# Patient Record
Sex: FEMALE | Race: WHITE | NOT HISPANIC OR LATINO | Employment: OTHER | URBAN - NONMETROPOLITAN AREA
[De-identification: names, ages, dates, MRNs, and addresses within clinical notes are randomized per-mention and may not be internally consistent; named-entity substitution may affect disease eponyms.]

---

## 2023-05-12 ENCOUNTER — TELEPHONE (OUTPATIENT)
Dept: OBSTETRICS AND GYNECOLOGY | Facility: CLINIC | Age: 73
End: 2023-05-12

## 2023-09-06 ENCOUNTER — OFFICE VISIT (OUTPATIENT)
Dept: OBSTETRICS AND GYNECOLOGY | Facility: CLINIC | Age: 73
End: 2023-09-06
Payer: MEDICARE

## 2023-09-06 VITALS
WEIGHT: 147 LBS | HEIGHT: 66 IN | DIASTOLIC BLOOD PRESSURE: 70 MMHG | BODY MASS INDEX: 23.63 KG/M2 | SYSTOLIC BLOOD PRESSURE: 124 MMHG

## 2023-09-06 DIAGNOSIS — B37.2 YEAST INFECTION OF THE SKIN: Primary | ICD-10-CM

## 2023-09-06 PROCEDURE — 1160F RVW MEDS BY RX/DR IN RCRD: CPT | Performed by: PHYSICIAN ASSISTANT

## 2023-09-06 PROCEDURE — 99203 OFFICE O/P NEW LOW 30 MIN: CPT | Performed by: PHYSICIAN ASSISTANT

## 2023-09-06 PROCEDURE — 1159F MED LIST DOCD IN RCRD: CPT | Performed by: PHYSICIAN ASSISTANT

## 2023-09-06 RX ORDER — NYSTATIN 100000 [USP'U]/G
POWDER TOPICAL 2 TIMES DAILY
Qty: 30 G | Refills: 1 | Status: SHIPPED | OUTPATIENT
Start: 2023-09-06

## 2023-11-10 ENCOUNTER — TRANSCRIBE ORDERS (OUTPATIENT)
Dept: ADMINISTRATIVE | Facility: HOSPITAL | Age: 73
End: 2023-11-10
Payer: MEDICARE

## 2023-11-10 DIAGNOSIS — R10.9 RIGHT SIDED ABDOMINAL PAIN: Primary | ICD-10-CM

## 2024-02-07 ENCOUNTER — TRANSCRIBE ORDERS (OUTPATIENT)
Dept: ADMINISTRATIVE | Facility: HOSPITAL | Age: 74
End: 2024-02-07
Payer: MEDICARE

## 2024-02-07 DIAGNOSIS — Z12.31 ENCOUNTER FOR SCREENING MAMMOGRAM FOR MALIGNANT NEOPLASM OF BREAST: Primary | ICD-10-CM

## 2024-02-07 DIAGNOSIS — Z78.0 ASYMPTOMATIC MENOPAUSAL STATE: Primary | ICD-10-CM

## 2024-05-15 ENCOUNTER — APPOINTMENT (OUTPATIENT)
Dept: BONE DENSITY | Facility: HOSPITAL | Age: 74
End: 2024-05-15
Payer: MEDICARE

## 2024-05-15 ENCOUNTER — HOSPITAL ENCOUNTER (OUTPATIENT)
Dept: MAMMOGRAPHY | Facility: HOSPITAL | Age: 74
Discharge: HOME | End: 2024-05-15
Payer: MEDICARE

## 2024-05-15 DIAGNOSIS — Z78.0 ASYMPTOMATIC MENOPAUSAL STATE: ICD-10-CM

## 2024-05-15 DIAGNOSIS — Z12.31 ENCOUNTER FOR SCREENING MAMMOGRAM FOR MALIGNANT NEOPLASM OF BREAST: ICD-10-CM

## 2024-05-15 PROCEDURE — 77080 DXA BONE DENSITY AXIAL: CPT

## 2024-05-15 PROCEDURE — 77063 BREAST TOMOSYNTHESIS BI: CPT

## 2024-05-15 PROCEDURE — 77067 SCR MAMMO BI INCL CAD: CPT

## 2024-08-16 ENCOUNTER — OFFICE VISIT (OUTPATIENT)
Dept: OBSTETRICS AND GYNECOLOGY | Facility: CLINIC | Age: 74
End: 2024-08-16
Payer: MEDICARE

## 2024-08-16 VITALS
SYSTOLIC BLOOD PRESSURE: 120 MMHG | BODY MASS INDEX: 23.85 KG/M2 | WEIGHT: 148.4 LBS | HEIGHT: 66 IN | DIASTOLIC BLOOD PRESSURE: 68 MMHG

## 2024-08-16 DIAGNOSIS — R35.0 URINARY FREQUENCY: ICD-10-CM

## 2024-08-16 DIAGNOSIS — R39.15 URINARY URGENCY: Primary | ICD-10-CM

## 2024-08-16 DIAGNOSIS — N39.46 MIXED INCONTINENCE: ICD-10-CM

## 2024-08-16 DIAGNOSIS — N81.6 RECTOCELE: ICD-10-CM

## 2024-08-16 DIAGNOSIS — N81.11 CYSTOCELE, MIDLINE: ICD-10-CM

## 2024-08-16 LAB
BILIRUB BLD-MCNC: NEGATIVE MG/DL
CLARITY, POC: CLEAR
COLOR UR: YELLOW
GLUCOSE UR STRIP-MCNC: NEGATIVE MG/DL
KETONES UR QL: ABNORMAL
LEUKOCYTE EST, POC: NEGATIVE
NITRITE UR-MCNC: NEGATIVE MG/ML
PH UR: 6 [PH] (ref 5–8)
PROT UR STRIP-MCNC: NEGATIVE MG/DL
RBC # UR STRIP: NEGATIVE /UL
SP GR UR: 1.01 (ref 1–1.03)
UROBILINOGEN UR QL: NORMAL

## 2024-08-16 RX ORDER — ESTRADIOL 0.1 MG/G
CREAM VAGINAL
Qty: 42.5 G | Refills: 4 | Status: SHIPPED | OUTPATIENT
Start: 2024-08-16

## 2024-08-16 RX ORDER — MIRABEGRON 25 MG/1
25 TABLET, FILM COATED, EXTENDED RELEASE ORAL DAILY
Qty: 30 TABLET | Refills: 3 | Status: SHIPPED | OUTPATIENT
Start: 2024-08-16 | End: 2025-08-16

## 2024-08-21 ENCOUNTER — TRANSCRIBE ORDERS (OUTPATIENT)
Dept: ADMINISTRATIVE | Facility: HOSPITAL | Age: 74
End: 2024-08-21
Payer: MEDICARE

## 2024-08-21 DIAGNOSIS — R10.9 ABDOMINAL PAIN, UNSPECIFIED ABDOMINAL LOCATION: Primary | ICD-10-CM

## 2024-08-29 ENCOUNTER — OFFICE VISIT (OUTPATIENT)
Dept: GASTROENTEROLOGY | Facility: CLINIC | Age: 74
End: 2024-08-29
Payer: MEDICARE

## 2024-08-29 ENCOUNTER — HOSPITAL ENCOUNTER (OUTPATIENT)
Dept: CT IMAGING | Facility: HOSPITAL | Age: 74
Discharge: HOME | End: 2024-08-29
Admitting: NURSE PRACTITIONER
Payer: MEDICARE

## 2024-08-29 VITALS
HEART RATE: 74 BPM | OXYGEN SATURATION: 98 % | DIASTOLIC BLOOD PRESSURE: 84 MMHG | SYSTOLIC BLOOD PRESSURE: 122 MMHG | WEIGHT: 150 LBS | BODY MASS INDEX: 24.21 KG/M2

## 2024-08-29 DIAGNOSIS — Z86.010 PERSONAL HISTORY OF COLONIC POLYPS: ICD-10-CM

## 2024-08-29 DIAGNOSIS — R10.30 LOWER ABDOMINAL PAIN: Chronic | ICD-10-CM

## 2024-08-29 DIAGNOSIS — K58.0 IRRITABLE BOWEL SYNDROME WITH DIARRHEA: Chronic | ICD-10-CM

## 2024-08-29 DIAGNOSIS — R19.7 DIARRHEA, UNSPECIFIED TYPE: Primary | Chronic | ICD-10-CM

## 2024-08-29 DIAGNOSIS — K21.9 GASTROESOPHAGEAL REFLUX DISEASE WITHOUT ESOPHAGITIS: Chronic | ICD-10-CM

## 2024-08-29 DIAGNOSIS — R19.7 DIARRHEA, UNSPECIFIED TYPE: Chronic | ICD-10-CM

## 2024-08-29 PROBLEM — Z12.11 ENCOUNTER FOR SCREENING FOR MALIGNANT NEOPLASM OF COLON: Status: RESOLVED | Noted: 2022-06-22 | Resolved: 2024-08-29

## 2024-08-29 PROCEDURE — 1159F MED LIST DOCD IN RCRD: CPT | Performed by: NURSE PRACTITIONER

## 2024-08-29 PROCEDURE — 25510000001 IOPAMIDOL 61 % SOLUTION: Performed by: NURSE PRACTITIONER

## 2024-08-29 PROCEDURE — 1160F RVW MEDS BY RX/DR IN RCRD: CPT | Performed by: NURSE PRACTITIONER

## 2024-08-29 PROCEDURE — 74177 CT ABD & PELVIS W/CONTRAST: CPT

## 2024-08-29 PROCEDURE — 99214 OFFICE O/P EST MOD 30 MIN: CPT | Performed by: NURSE PRACTITIONER

## 2024-08-29 RX ORDER — IOPAMIDOL 612 MG/ML
100 INJECTION, SOLUTION INTRAVASCULAR
Status: COMPLETED | OUTPATIENT
Start: 2024-08-29 | End: 2024-08-29

## 2024-08-29 RX ORDER — GLYCOPYRROLATE 1 MG/1
1 TABLET ORAL 2 TIMES DAILY PRN
Qty: 30 TABLET | Refills: 1 | Status: SHIPPED | OUTPATIENT
Start: 2024-08-29

## 2024-08-29 RX ADMIN — IOPAMIDOL 100 ML: 612 INJECTION, SOLUTION INTRAVENOUS at 16:00

## 2024-08-30 ENCOUNTER — TELEPHONE (OUTPATIENT)
Dept: GASTROENTEROLOGY | Facility: CLINIC | Age: 74
End: 2024-08-30
Payer: MEDICARE

## 2024-09-09 ENCOUNTER — PRIOR AUTHORIZATION (OUTPATIENT)
Dept: GASTROENTEROLOGY | Facility: CLINIC | Age: 74
End: 2024-09-09
Payer: MEDICARE

## 2024-09-09 DIAGNOSIS — K58.0 IRRITABLE BOWEL SYNDROME WITH DIARRHEA: Primary | ICD-10-CM

## 2024-09-09 DIAGNOSIS — R19.7 DIARRHEA, UNSPECIFIED TYPE: ICD-10-CM

## 2024-09-11 ENCOUNTER — TELEPHONE (OUTPATIENT)
Dept: GASTROENTEROLOGY | Facility: CLINIC | Age: 74
End: 2024-09-11
Payer: MEDICARE

## 2024-09-12 ENCOUNTER — TELEPHONE (OUTPATIENT)
Dept: UROLOGY | Facility: CLINIC | Age: 74
End: 2024-09-12

## 2024-09-26 ENCOUNTER — OFFICE VISIT (OUTPATIENT)
Dept: UROLOGY | Facility: CLINIC | Age: 74
End: 2024-09-26
Payer: MEDICARE

## 2024-09-26 VITALS
TEMPERATURE: 97.7 F | DIASTOLIC BLOOD PRESSURE: 64 MMHG | SYSTOLIC BLOOD PRESSURE: 122 MMHG | OXYGEN SATURATION: 98 % | HEIGHT: 66 IN | WEIGHT: 150 LBS | HEART RATE: 60 BPM | BODY MASS INDEX: 24.11 KG/M2

## 2024-09-26 DIAGNOSIS — N39.41 URGE INCONTINENCE: Primary | ICD-10-CM

## 2024-09-26 DIAGNOSIS — N32.81 OVERACTIVE BLADDER: ICD-10-CM

## 2024-09-26 DIAGNOSIS — N39.3 STRESS INCONTINENCE: ICD-10-CM

## 2024-09-26 PROBLEM — I10 HYPERTENSIVE DISORDER: Status: ACTIVE | Noted: 2024-02-07

## 2024-09-26 PROBLEM — G62.9 NEUROPATHY: Status: ACTIVE | Noted: 2024-02-07

## 2024-09-26 PROBLEM — E78.5 HYPERLIPIDEMIA: Status: ACTIVE | Noted: 2024-02-07

## 2024-09-26 PROBLEM — G43.909 MIGRAINE: Status: ACTIVE | Noted: 2024-02-07

## 2024-09-26 PROBLEM — M47.816 LUMBAR SPONDYLOSIS: Status: ACTIVE | Noted: 2022-12-02

## 2024-09-26 PROBLEM — M79.10 MUSCLE PAIN: Status: ACTIVE | Noted: 2022-12-02

## 2024-09-26 PROBLEM — G89.4 CHRONIC PAIN DISORDER: Status: ACTIVE | Noted: 2022-12-02

## 2024-09-26 PROBLEM — E03.9 HYPOTHYROIDISM: Status: ACTIVE | Noted: 2024-02-07

## 2024-09-26 LAB
BILIRUB BLD-MCNC: NEGATIVE MG/DL
CLARITY, POC: CLEAR
COLOR UR: YELLOW
EXPIRATION DATE: NORMAL
GLUCOSE UR STRIP-MCNC: NEGATIVE MG/DL
KETONES UR QL: NEGATIVE
LEUKOCYTE EST, POC: NEGATIVE
Lab: NORMAL
NITRITE UR-MCNC: NEGATIVE MG/ML
PH UR: 6.5 [PH] (ref 5–8)
PROT UR STRIP-MCNC: NEGATIVE MG/DL
RBC # UR STRIP: NEGATIVE /UL
SP GR UR: 1.02 (ref 1–1.03)
UROBILINOGEN UR QL: NORMAL

## 2024-09-26 RX ORDER — MONTELUKAST SODIUM 4 MG/1
TABLET, CHEWABLE ORAL
COMMUNITY
Start: 2024-08-21

## 2024-09-26 RX ORDER — ROSUVASTATIN CALCIUM 5 MG/1
TABLET, COATED ORAL
COMMUNITY
Start: 2024-09-24

## 2025-01-08 ENCOUNTER — OFFICE VISIT (OUTPATIENT)
Dept: UROLOGY | Facility: CLINIC | Age: 75
End: 2025-01-08
Payer: MEDICARE

## 2025-01-08 VITALS
TEMPERATURE: 98 F | BODY MASS INDEX: 24.11 KG/M2 | DIASTOLIC BLOOD PRESSURE: 84 MMHG | HEART RATE: 75 BPM | HEIGHT: 66 IN | OXYGEN SATURATION: 96 % | WEIGHT: 150 LBS | SYSTOLIC BLOOD PRESSURE: 140 MMHG

## 2025-01-08 DIAGNOSIS — N39.41 URGE INCONTINENCE: Primary | ICD-10-CM

## 2025-01-08 DIAGNOSIS — N32.81 OVERACTIVE BLADDER: ICD-10-CM

## 2025-01-08 LAB
BILIRUB BLD-MCNC: NEGATIVE MG/DL
CLARITY, POC: CLEAR
COLOR UR: YELLOW
EXPIRATION DATE: NORMAL
GLUCOSE UR STRIP-MCNC: NEGATIVE MG/DL
KETONES UR QL: NEGATIVE
LEUKOCYTE EST, POC: NEGATIVE
Lab: NORMAL
NITRITE UR-MCNC: NEGATIVE MG/ML
PH UR: 7 [PH] (ref 5–8)
PROT UR STRIP-MCNC: NEGATIVE MG/DL
RBC # UR STRIP: NEGATIVE /UL
SP GR UR: 1.02 (ref 1–1.03)
UROBILINOGEN UR QL: NORMAL

## 2025-01-08 RX ORDER — CHOLESTYRAMINE 4 G/9G
POWDER, FOR SUSPENSION ORAL
COMMUNITY
Start: 2024-12-02

## 2025-01-08 NOTE — PROGRESS NOTES
InterStim follow-up     Patient Name: Jessi Fletcher  : 1950   MRN: [id number]     Chief Complaint:   Chief Complaint   Patient presents with    Follow-up     1 month S/P Interstim, has had some burning and pressure since implantation.       Referring Provider: No ref. provider found    History of Present Illness: Jessi Fletcher is a 74 y.o. female with history below in assessment, who presents for InterStim follow up.  History of mixed UI, urge incontinence is most bothersome.  Previously seen Dr. Rivera and had InterStim placed in  with battery change in 2016.  Patient had InterStim implantable pulse generator placement removal and replacement on 24.  Since exchange she reports urgency and frequency.  She is changing clothes 2 times per day as she leaks walking to the restroom.      Current Program: 3(-2,+0)     Current Amplitude: 0.7  PVR: 0 mL    Patient complaint:    InterStim is turned on  Stimulation is not uncomfortable.  No changes to diet  No constipation  No changes in medication  No changes in other medical conditions  Denies recent fall or recent procedure/surgery aside from InterStim exchange    Subjective      Review of System:   As noted in HPI.    Past Medical History:   Past Medical History:   Diagnosis Date    Chronic pain syndrome     COPD (chronic obstructive pulmonary disease)     Disease of thyroid gland     GERD (gastroesophageal reflux disease)     Hyperlipidemia     Hypertension     Irritable bowel disease     Migraines     Mixed stress and urge urinary incontinence     PONV (postoperative nausea and vomiting)     Wears glasses        Past Surgical History:   Past Surgical History:   Procedure Laterality Date    BREAST BIOPSY      BREAST SURGERY      biopsy    COLONOSCOPY N/A 2022    Procedure: COLONOSCOPY, polypectomy x2 and biopsies;  Surgeon: Delgado Morales MD;  Location: [facility] ENDOSCOPY;  Service: Gastroenterology;  Laterality: N/A;    ENDOSCOPY  N/A 09/09/2022    Procedure: ESOPHAGOGASTRODUODENOSCOPY WITH BIOPSY;  Surgeon: Delgado Morales MD;  Location: [facility] ENDOSCOPY;  Service: Gastroenterology;  Laterality: N/A;    HYSTERECTOMY  1988    IMPLANTATION VAGAL NERVE STIMULATOR      bladder stimulator    OOPHORECTOMY         Family History:   Family History   Problem Relation Age of Onset    Cancer Mother     Asthma Father     Heart attack Father     Diabetes Sister     Cancer Brother     Cancer Brother     Cancer Brother     Breast cancer Maternal Aunt     Colon cancer Neg Hx        Social History:   Social History     Socioeconomic History    Marital status:    Tobacco Use    Smoking status: Never     Passive exposure: Never    Smokeless tobacco: Never   Vaping Use    Vaping status: Never Used   Substance and Sexual Activity    Alcohol use: Yes     Comment: social    Drug use: Never    Sexual activity: Defer     Birth control/protection: Post-menopausal, Hysterectomy       Medications:     Current Outpatient Medications:     albuterol sulfate  (90 Base) MCG/ACT inhaler, , Disp: , Rfl:     carvedilol (COREG) 3.125 MG tablet, Take 1 tablet by mouth 2 (Two) Times a Day With Meals., Disp: , Rfl:     cholestyramine (QUESTRAN) 4 GM/DOSE powder, MIX ONE (1) SCOOP WITH NON CARBONATED LIQUID AND DRINK TWICE A DAY, Disp: , Rfl:     colestipol (COLESTID) 1 g tablet, Take 2 tablets twice a day by oral route as needed for 30 days., Disp: , Rfl:     estradiol (ESTRACE VAGINAL) 0.1 MG/GM vaginal cream, Insert 1 gm intravaginally 2 times each week, Disp: 42.5 g, Rfl: 4    fluconazole (DIFLUCAN) 150 MG tablet, Take 1 tablet by mouth Daily., Disp: , Rfl:     fluticasone (FLONASE) 50 MCG/ACT nasal spray, , Disp: , Rfl:     gabapentin (NEURONTIN) 800 MG tablet, Take 1 tablet by mouth Daily., Disp: , Rfl:     glycopyrrolate (ROBINUL) 1 MG tablet, Take 1 tablet by mouth 2 (Two) Times a Day As Needed (lower abdominal cramping)., Disp: 30 tablet, Rfl: 1     "ipratropium-albuterol (DUO-NEB) 0.5-2.5 mg/3 ml nebulizer, , Disp: , Rfl:     levothyroxine (SYNTHROID, LEVOTHROID) 100 MCG tablet, Take 1 tablet by mouth Daily., Disp: , Rfl:     lisinopril-hydrochlorothiazide (PRINZIDE,ZESTORETIC) 20-12.5 MG per tablet, Take 1 tablet by mouth Daily., Disp: , Rfl:     montelukast (SINGULAIR) 10 MG tablet, Take 1 tablet by mouth Every Night., Disp: , Rfl:     nystatin (MYCOSTATIN) 609875 UNIT/GM powder, Apply  topically to the appropriate area as directed 2 (Two) Times a Day., Disp: 30 g, Rfl: 1    omeprazole (priLOSEC) 20 MG capsule, Take 1 capsule by mouth Daily., Disp: , Rfl:     rizatriptan (MAXALT) 10 MG tablet, , Disp: , Rfl:     rosuvastatin (CRESTOR) 5 MG tablet, TAKE ONE (1) TABLET BY MOUTH EVERY NIGHT AT BEDTIME, Disp: , Rfl:     topiramate (TOPAMAX) 50 MG tablet, Take 2 tablets by mouth Daily., Disp: , Rfl:     traZODone (DESYREL) 150 MG tablet, Take 1 tablet by mouth Every Night., Disp: , Rfl:     Trelegy Ellipta 100-62.5-25 MCG/INH inhaler, Inhale 1 puff Daily., Disp: , Rfl:     denosumab (Prolia) 60 MG/ML solution prefilled syringe syringe, Inject 1 mL under the skin into the appropriate area as directed 1 (One) Time for 1 dose., Disp: 1 mL, Rfl: 0    Allergies:   Allergies   Allergen Reactions    Compazine [Prochlorperazine] Itching    Demerol [Meperidine] Itching and Nausea Only    Cephalexin Rash and Other (See Comments)       Objective     Vital Signs:   Visit Vitals  /84   Pulse 75   Temp 98 °F (36.7 °C)   Ht 167.6 cm (65.98\")   Wt 68 kg (150 lb)   SpO2 96%   BMI 24.22 kg/m²      Body mass index is 24.22 kg/m².     Physical Exam:   Physical Exam  Vitals and nursing note reviewed.   Constitutional:       General: She is awake. She is not in acute distress.  Pulmonary:      Effort: Pulmonary effort is normal.   Skin:     Comments: Right buttock incision well approximated with no s/s of infection, healed   Neurological:      Mental Status: She is alert and " oriented to person, place, and time. Mental status is at baseline.   Psychiatric:         Mood and Affect: Mood normal.         Behavior: Behavior is cooperative.        Labs  Brief Urine Lab Results  (Last result in the past 365 days)        Color   Clarity   Blood   Leuk Est   Nitrite   Protein   CREAT   Urine HCG        01/08/25 1629 Yellow   Clear   Negative   Negative   Negative   Negative                   Lab Results   Component Value Date    GLUCOSE 102 (H) 12/27/2021    CALCIUM 8.8 12/27/2021     (L) 12/27/2021    K 3.6 12/27/2021    CO2 23.6 12/27/2021     12/27/2021    BUN 17 12/27/2021    CREATININE 0.70 06/17/2022    EGFRIFNONA 61 12/27/2021    BCR 18.7 12/27/2021    ANIONGAP 5.4 12/27/2021       Lab Results   Component Value Date    WBC 14.40 (H) 01/08/2020    HGB 14.3 01/08/2020    HCT 42.0 01/08/2020    MCV 90.5 01/08/2020     01/08/2020         I have reviewed the above labs.    PVR  Post-void residual performed with ultrasound by staff and interpreted by me - 0 mL     Assessment / Plan      Assessment:   Diagnoses and all orders for this visit:    1. Urge incontinence (Primary)  -     POC Urinalysis Dipstick, Automated    2. Overactive bladder         74 y.o. female with history below in assessment, who presents for InterStim follow up.  History of mixed UI, urge incontinence is most bothersome.  Previously seen Dr. Rivera and had InterStim placed in 2008 with battery change in 2016.  Patient had InterStim implantable pulse generator placement removal and replacement on 12/03/24.  Leads were not exchanged after discussing with  and decision was made to only exchange IPG.  Since exchange she reports urgency and frequency.  She is changing clothes 2 times per day as she leaks walking to the restroom.  UA today WNL, PVR 0 mL.    Impedance checked  Battery checked  Amplitude changed to 1.1 mA  Program changed to 2    Patient still has NELIDA and is concerned with bladder prolapse.   Will perform pelvic on follow up with full bladder.  Patient will follow up with Xenia in 1 month on InterStim programming day.  If she does not have any issues or feel she does not need a program change she will contact office and schedule in 1 year with Xenia for InterStim management.    Plan:  Program changed to 2 at 1.1 mA  Pelvic exam on follow up with full bladder.  Follow up with Xenia in 1 month on InterStim programming day.        Follow Up:   Return in about 4 weeks (around 2/5/2025) for InterStim programming day, with Xenia.    OZZY Noyola  [facility]

## 2025-01-09 ENCOUNTER — TELEPHONE (OUTPATIENT)
Dept: UROLOGY | Facility: CLINIC | Age: 75
End: 2025-01-09

## 2025-01-09 NOTE — TELEPHONE ENCOUNTER
Caller: Jessi Fletcher    Relationship to patient: Self    Best call back number: [phone]    Patient is needing: PT CALLED TO CONFIRM THE RESCHEDULED APPOINTMENT FOR 2/26/25 IS FINE.  THANK YOU.

## 2025-02-13 ENCOUNTER — TELEPHONE (OUTPATIENT)
Dept: UROLOGY | Facility: CLINIC | Age: 75
End: 2025-02-13

## 2025-02-13 NOTE — TELEPHONE ENCOUNTER
Provider: SOCORRO TAYLOR    Caller:RODDY FRAUSTO    Relationship to Patient: SELF    Reason for Call: PATIENT NEEDS TO HAVE A MRI DONE. SHE IS WANTING TO KNOW WITH THE INTERSTIM DEVICE IF THIS IS OKAY. PLEASE REACH OUT TO DISCUSS    When was the patient last seen: 01-08-25

## 2025-02-19 ENCOUNTER — TELEPHONE (OUTPATIENT)
Dept: UROLOGY | Facility: CLINIC | Age: 75
End: 2025-02-19
Payer: MEDICARE

## 2025-02-20 ENCOUNTER — TELEPHONE (OUTPATIENT)
Dept: UROLOGY | Facility: CLINIC | Age: 75
End: 2025-02-20
Payer: MEDICARE

## 2025-02-20 NOTE — TELEPHONE ENCOUNTER
Called patient back after she called the Hub and she hung uop I was going to give her the [employer] rep number LVM to return my call.    Cheri KENDRICK

## 2025-02-26 ENCOUNTER — OFFICE VISIT (OUTPATIENT)
Dept: UROLOGY | Facility: CLINIC | Age: 75
End: 2025-02-26
Payer: MEDICARE

## 2025-02-26 VITALS
HEART RATE: 68 BPM | OXYGEN SATURATION: 98 % | SYSTOLIC BLOOD PRESSURE: 128 MMHG | TEMPERATURE: 97 F | DIASTOLIC BLOOD PRESSURE: 84 MMHG | HEIGHT: 63 IN | WEIGHT: 150 LBS | BODY MASS INDEX: 26.58 KG/M2

## 2025-02-26 DIAGNOSIS — N95.8 GENITOURINARY SYNDROME OF MENOPAUSE: ICD-10-CM

## 2025-02-26 DIAGNOSIS — N36.42 URETHRAL SPHINCTER DEFICIENCY, INTRINSIC (ISD): ICD-10-CM

## 2025-02-26 DIAGNOSIS — N39.41 URGE INCONTINENCE: ICD-10-CM

## 2025-02-26 DIAGNOSIS — N81.9 VAGINAL VAULT PROLAPSE: ICD-10-CM

## 2025-02-26 RX ORDER — OMEPRAZOLE 40 MG/1
1 CAPSULE, DELAYED RELEASE ORAL DAILY
COMMUNITY
Start: 2024-12-16

## 2025-02-26 RX ORDER — LISINOPRIL 20 MG/1
1 TABLET ORAL DAILY
COMMUNITY
Start: 2025-02-12

## 2025-02-26 RX ORDER — ESTRADIOL 0.1 MG/G
CREAM VAGINAL
Qty: 42.5 G | Refills: 3 | Status: SHIPPED | OUTPATIENT
Start: 2025-02-26

## 2025-02-26 NOTE — PROGRESS NOTES
InterStim follow-up     Patient Name: Jessi Fletcher  : 1950   MRN: [id number]     Chief Complaint:   Chief Complaint   Patient presents with    interstim     History of Present Illness  The patient is a 74-year-old female presenting for urinary incontinence evaluation.    Urinary Incontinence  - Dissatisfaction with InterStim device due to persistent urinary pressure and frequent urination  - Symptoms persist despite an adjustment last month  - Uncertainty if discomfort is caused by the device or bladder condition  - Not on blood thinners  - Diagnosed with COPD, no oxygen therapy required  - Does not use semaglutide injections    MEDICATIONS  Current: Lisinopril  Discontinued: Lisinopril with hydrochlorothiazide       Device: Interstim X battery with old leads, battery exchanged on 2024  Current Program:   2(-1,+3)     Current Amplitude:  1.1mA  PVR:   0 on 2025    Patient complaint:  bladder pressure, and leaking with coughing and sneezing  Changes clothes per day 1-2  Leaks 1-2 with coughing   Urgency moderate to strong   Frequency 10 times daily   Nocturia 1-2    InterStim turned on yes  Is stimulation uncomfortable no but she has pressure  Any changes to diet No  Any constipation No  Any changes in medication  stopped lisinopril/HCTZ they stopped the HCTZ  Any changes in other medical conditions No  Recent fall or recent procedure/surgery No    Daily water intake  2 bottles maybe   Caffeine: 2-3 cups of coffee daily   Pelvic floor PT  No    Other Bladder Medications and Treatments:  None currently       Subjective      Review of System:   As noted in HPI     Past Medical History:   Past Medical History:   Diagnosis Date    Chronic pain syndrome     COPD (chronic obstructive pulmonary disease)     Disease of thyroid gland     GERD (gastroesophageal reflux disease)     Hyperlipidemia     Hypertension     Irritable bowel disease     Migraines     Mixed stress and urge urinary incontinence      PONV (postoperative nausea and vomiting)     Wears glasses        Past Surgical History:   Past Surgical History:   Procedure Laterality Date    BREAST BIOPSY      BREAST SURGERY  1980    biopsy    COLONOSCOPY N/A 09/09/2022    Procedure: COLONOSCOPY, polypectomy x2 and biopsies;  Surgeon: Delgado Morales MD;  Location: [facility] ENDOSCOPY;  Service: Gastroenterology;  Laterality: N/A;    ENDOSCOPY N/A 09/09/2022    Procedure: ESOPHAGOGASTRODUODENOSCOPY WITH BIOPSY;  Surgeon: Delgado Morales MD;  Location: [facility] ENDOSCOPY;  Service: Gastroenterology;  Laterality: N/A;    HYSTERECTOMY  1988    INTERSTIM PLACEMENT  12/03/2024    Leads are NOT MRI compatible.  InterStim placed in 2008 with battery change in 2016.  IPG exchange 12/03/24    OOPHORECTOMY         Family History:   Family History   Problem Relation Age of Onset    Cancer Mother     Asthma Father     Heart attack Father     Diabetes Sister     Cancer Brother     Cancer Brother     Cancer Brother     Breast cancer Maternal Aunt     Colon cancer Neg Hx        Social History:   Social History     Socioeconomic History    Marital status:    Tobacco Use    Smoking status: Never     Passive exposure: Never    Smokeless tobacco: Never   Vaping Use    Vaping status: Never Used   Substance and Sexual Activity    Alcohol use: Yes     Comment: social    Drug use: Never    Sexual activity: Defer     Birth control/protection: Post-menopausal, Hysterectomy       Medications:     Current Outpatient Medications:     albuterol sulfate  (90 Base) MCG/ACT inhaler, , Disp: , Rfl:     carvedilol (COREG) 3.125 MG tablet, Take 1 tablet by mouth 2 (Two) Times a Day With Meals., Disp: , Rfl:     cholestyramine (QUESTRAN) 4 GM/DOSE powder, MIX ONE (1) SCOOP WITH NON CARBONATED LIQUID AND DRINK TWICE A DAY, Disp: , Rfl:     colestipol (COLESTID) 1 g tablet, Take 2 tablets twice a day by oral route as needed for 30 days., Disp: , Rfl:     estradiol (ESTRACE  "VAGINAL) 0.1 MG/GM vaginal cream, Insert 1 gm intravaginally 2 times each week, Disp: 42.5 g, Rfl: 4    fluconazole (DIFLUCAN) 150 MG tablet, Take 1 tablet by mouth Daily., Disp: , Rfl:     fluticasone (FLONASE) 50 MCG/ACT nasal spray, , Disp: , Rfl:     gabapentin (NEURONTIN) 800 MG tablet, Take 1 tablet by mouth Daily., Disp: , Rfl:     glycopyrrolate (ROBINUL) 1 MG tablet, Take 1 tablet by mouth 2 (Two) Times a Day As Needed (lower abdominal cramping)., Disp: 30 tablet, Rfl: 1    ipratropium-albuterol (DUO-NEB) 0.5-2.5 mg/3 ml nebulizer, , Disp: , Rfl:     levothyroxine (SYNTHROID, LEVOTHROID) 100 MCG tablet, Take 1 tablet by mouth Daily., Disp: , Rfl:     lisinopril (PRINIVIL,ZESTRIL) 20 MG tablet, Take 1 tablet by mouth Daily., Disp: , Rfl:     montelukast (SINGULAIR) 10 MG tablet, Take 1 tablet by mouth Every Night., Disp: , Rfl:     nystatin (MYCOSTATIN) 989603 UNIT/GM powder, Apply  topically to the appropriate area as directed 2 (Two) Times a Day., Disp: 30 g, Rfl: 1    omeprazole (priLOSEC) 40 MG capsule, Take 1 capsule by mouth Daily., Disp: , Rfl:     rizatriptan (MAXALT) 10 MG tablet, , Disp: , Rfl:     rosuvastatin (CRESTOR) 5 MG tablet, TAKE ONE (1) TABLET BY MOUTH EVERY NIGHT AT BEDTIME, Disp: , Rfl:     topiramate (TOPAMAX) 50 MG tablet, Take 2 tablets by mouth Daily., Disp: , Rfl:     traZODone (DESYREL) 150 MG tablet, Take 1 tablet by mouth Every Night., Disp: , Rfl:     Trelegy Ellipta 100-62.5-25 MCG/INH inhaler, Inhale 1 puff Daily., Disp: , Rfl:     denosumab (Prolia) 60 MG/ML solution prefilled syringe syringe, Inject 1 mL under the skin into the appropriate area as directed 1 (One) Time for 1 dose., Disp: 1 mL, Rfl: 0    Allergies:   Allergies   Allergen Reactions    Compazine [Prochlorperazine] Itching    Demerol [Meperidine] Itching and Nausea Only    Cephalexin Rash and Other (See Comments)       Objective     Visit Vitals  /84   Pulse 68   Temp 97 °F (36.1 °C)   Ht 160 cm (63\") " "  Wt 68 kg (150 lb)   SpO2 98%   BMI 26.57 kg/m²        Body mass index is 26.57 kg/m².     Physical Exam  Vitals and nursing note reviewed. Exam conducted with a chaperone present.   Constitutional:       General: She is not in acute distress.     Appearance: Normal appearance. She is not ill-appearing.   Pulmonary:      Effort: Pulmonary effort is normal. No respiratory distress.   Genitourinary:     Comments:  Decreased rugation, pale pink vulvar tissue, thin labia.  No lesions.  No hypermobility with coughing, moderate volume urine expression visualized with valsalva.  No notable cystocele, grade 2 rectocele with enterocele, and vaginal vault prolapse.          Skin:     General: Skin is warm and dry.   Neurological:      General: No focal deficit present.      Mental Status: She is alert and oriented to person, place, and time.   Psychiatric:         Mood and Affect: Mood normal.         Behavior: Behavior normal.         Labs  Lab Results   Component Value Date    COLORU Yellow 01/08/2025    CLARITYU Clear 01/08/2025    SPECGRAV 1.020 01/08/2025    PHUR 7.0 01/08/2025    LEUKOCYTESUR Negative 01/08/2025    NITRITE Negative 01/08/2025    PROTEINPOCUA Negative 01/08/2025    GLUCOSEUR Negative 01/08/2025    KETONESU Negative 01/08/2025    UROBILINOGEN 0.2 E.U./dL 01/08/2025    BILIRUBINUR Negative 01/08/2025    RBCUR Negative 01/08/2025      No results found for: \"WBCUA\", \"RBCUA\", \"BACTERIA\", \"HYALCASTU\", \"SQUAMEPIUA\"     Lab Results   Component Value Date    WBC 14.40 (H) 01/08/2020    HGB 14.3 01/08/2020    HCT 42.0 01/08/2020    MCV 90.5 01/08/2020     01/08/2020     Lab Results   Component Value Date    GLUCOSE 102 (H) 12/27/2021    CALCIUM 8.8 12/27/2021     (L) 12/27/2021    K 3.6 12/27/2021    CO2 23.6 12/27/2021     12/27/2021    BUN 17 12/27/2021    BUN 10 01/08/2020    CREATININE 0.70 06/17/2022    CREATININE 0.91 12/27/2021    BCR 18.7 12/27/2021    ANIONGAP 5.4 12/27/2021    ALT 14 " 12/27/2021    AST 19 12/27/2021       Lab Results   Component Value Date    HGBA1C 5.80 (H) 01/08/2020        Results         Radiographic Studies  No Images in the past 120 days found..    I have reviewed the above labs and imaging.     Assessment / Plan    Assessment:   There are no diagnoses linked to this encounter.   Assessment & Plan  1.  Urge incontinence: Persistent.  - Discussed surgical options, including Bulkamid injection  - Good candidate for Bulkamid due to minimal urethral hypermobility  - Discussed surgery risks (infection, bleeding, organ damage)  - Adjusted InterStim device settings  - Referral to Dr. Galdamez for potential Bulkamid injection  - Start vaginal estrogen cream twice weekly at bedtime  - Discussed estrogen cream risks (stroke, heart attack, blood clots, breast cancer), emphasizing minimal risk with topical use  - Advise maintaining regular bowel movements to prevent constipation    2.  Intrinsic sphincter deficiency: Witnessed urinary leaking on exam today  - Discussed surgical options, including Bulkamid injection  - Good candidate for Bulkamid due to minimal urethral hypermobility  - Discussed surgery risks (infection, bleeding, organ damage)  - We discussed the risks, benefits, and alternatives to Bulkamid, including but not limited to bleeding, infection, damage nearby structures, need for further surgeries, and complications with anesthesia.  Patient consented to cystoscopy with Bulkamid.  Patient risks include: 2% risk of recurrent UTI and urinary retention    3. Cystocele and vaginal vault prolapse.  - Referral to gynecology for further evaluation and management  - Advise maintaining regular bowel movements to prevent constipation    4.  Genitourinary syndrome of menopause  - Start vaginal estrogen cream twice weekly at bedtime  - Discussed estrogen cream risks (stroke, heart attack, blood clots, breast cancer), emphasizing minimal risk with topical use      PROCEDURE  InterStim  device was inserted 12/2024     Impedance checked and no impedence noted   Battery checked no    Amplitude changed yes   Program changed yes    Program 3 @ 0.5 stimulation felt in bicycle seat    Surgical Concerns:  HTN, hypothyroidism, GERD, COPD does not require oxygen, BMI 26    Xenia aBjwa, MSN, APRN, FNP-C  [facility]

## 2025-03-14 ENCOUNTER — TELEPHONE (OUTPATIENT)
Dept: UROLOGY | Facility: CLINIC | Age: 75
End: 2025-03-14

## 2025-03-14 NOTE — TELEPHONE ENCOUNTER
Hub staff attempted to follow warm transfer process and was unsuccessful     Caller: Jessi Fletcher    Relationship to patient: Self    Best call back number: [phone] (home)     Patient is needing:   RETURNING MISSED CALL TO GLENDY

## 2025-03-18 ENCOUNTER — OFFICE VISIT (OUTPATIENT)
Dept: UROLOGY | Facility: CLINIC | Age: 75
End: 2025-03-18
Payer: MEDICARE

## 2025-03-18 ENCOUNTER — TELEPHONE (OUTPATIENT)
Dept: UROLOGY | Facility: CLINIC | Age: 75
End: 2025-03-18

## 2025-03-18 VITALS
WEIGHT: 150 LBS | HEART RATE: 76 BPM | BODY MASS INDEX: 26.58 KG/M2 | OXYGEN SATURATION: 90 % | TEMPERATURE: 98.6 F | HEIGHT: 63 IN | SYSTOLIC BLOOD PRESSURE: 142 MMHG | DIASTOLIC BLOOD PRESSURE: 96 MMHG

## 2025-03-18 DIAGNOSIS — R33.8 ACUTE URINARY RETENTION: Primary | ICD-10-CM

## 2025-03-18 PROCEDURE — 99024 POSTOP FOLLOW-UP VISIT: CPT | Performed by: NURSE PRACTITIONER

## 2025-03-18 NOTE — TELEPHONE ENCOUNTER
Hub staff attempted to follow warm transfer process and was unsuccessful     Caller: RODDY FRAUSTO    Relationship to patient: SELF    Best call back number: [phone] (home)      Patient is needing: PT HAD SURGERY WITH DR BARNETT THIS MORNING AND NOW CANNOT URINATE SINCE LEAVING THE HOPITAL. ALSO LOOKING TO SEE IF SOMETHING STRONGER FOR MEDICAINE CAN BE GIVEN AS SHE IS ALSO CRAMPING VERY [facility]. PLEASE CALL BACK TO DISCUSS. THANK YOU

## 2025-03-18 NOTE — PROGRESS NOTES
Office Visit     Patient Name: Jessi Fletcher  : 1950   MRN: [id number]     Patient or patient representative verbalized consent for the use of Ambient Listening during the visit with  OZZY Evans for chart documentation. 3/18/2025  13:40 EDT    Chief Complaint: No chief complaint on file.    Referring Provider: No ref. provider found    Primary Care Provider: Elsy Chung MD     History of Present Illness  The patient presents for evaluation of urinary retention.    Urinary Retention  - She reports cramping symptoms following a surgical procedure, progressively worsening, with difficulty emptying her bladder.  - She had an episode of heavy bleeding, now improved.  - Severe discomfort prevents sitting and lying down.  - Her daughter-in-law, a registered nurse, advised contacting the physician.  - She feels relief from catheter placement, alleviating some discomfort.      Subjective   Review of System:   As noted in HPI.    Past Medical History:   Diagnosis Date    Chronic pain syndrome     COPD (chronic obstructive pulmonary disease)     Disease of thyroid gland     GERD (gastroesophageal reflux disease)     Hyperlipidemia     Hypertension     Irritable bowel disease     Migraines     Mixed stress and urge urinary incontinence     PONV (postoperative nausea and vomiting)     Wears glasses      Past Surgical History:   Procedure Laterality Date    BREAST BIOPSY      BREAST SURGERY      biopsy    COLONOSCOPY N/A 2022    Procedure: COLONOSCOPY, polypectomy x2 and biopsies;  Surgeon: Delgado Morales MD;  Location: [facility] ENDOSCOPY;  Service: Gastroenterology;  Laterality: N/A;    ENDOSCOPY N/A 2022    Procedure: ESOPHAGOGASTRODUODENOSCOPY WITH BIOPSY;  Surgeon: Delgado Morales MD;  Location: [facility] ENDOSCOPY;  Service: Gastroenterology;  Laterality: N/A;    HYSTERECTOMY      INTERSTIM PLACEMENT  2024    Leads are NOT MRI compatible.  InterStim  placed in 2008 with battery change in 2016.  IPG exchange 12/03/24    OOPHORECTOMY       Family History   Problem Relation Age of Onset    Cancer Mother     Asthma Father     Heart attack Father     Diabetes Sister     Cancer Brother     Cancer Brother     Cancer Brother     Breast cancer Maternal Aunt     Colon cancer Neg Hx      Social History     Socioeconomic History    Marital status:    Tobacco Use    Smoking status: Never     Passive exposure: Never    Smokeless tobacco: Never   Vaping Use    Vaping status: Never Used   Substance and Sexual Activity    Alcohol use: Yes     Comment: social    Drug use: Never    Sexual activity: Defer     Birth control/protection: Post-menopausal, Hysterectomy       Current Outpatient Medications:     acetaminophen (TYLENOL) 500 MG tablet, Take 2 tablets by mouth Every 6 (Six) Hours for 3 days., Disp: 30 tablet, Rfl: 0    albuterol sulfate  (90 Base) MCG/ACT inhaler, , Disp: , Rfl:     carvedilol (COREG) 3.125 MG tablet, Take 1 tablet by mouth 2 (Two) Times a Day With Meals., Disp: , Rfl:     cholestyramine (QUESTRAN) 4 GM/DOSE powder, MIX ONE (1) SCOOP WITH NON CARBONATED LIQUID AND DRINK TWICE A DAY, Disp: , Rfl:     colestipol (COLESTID) 1 g tablet, Take 2 tablets twice a day by oral route as needed for 30 days., Disp: , Rfl:     denosumab (Prolia) 60 MG/ML solution prefilled syringe syringe, Inject 1 mL under the skin into the appropriate area as directed 1 (One) Time for 1 dose., Disp: 1 mL, Rfl: 0    estradiol (ESTRACE) 0.1 MG/GM vaginal cream, Apply 1 g two nights weekly at bed time, Disp: 42.5 g, Rfl: 3    fluconazole (DIFLUCAN) 150 MG tablet, Take 1 tablet by mouth Daily., Disp: , Rfl:     fluticasone (FLONASE) 50 MCG/ACT nasal spray, , Disp: , Rfl:     gabapentin (NEURONTIN) 800 MG tablet, Take 1 tablet by mouth Daily., Disp: , Rfl:     glycopyrrolate (ROBINUL) 1 MG tablet, Take 1 tablet by mouth 2 (Two) Times a Day As Needed (lower abdominal cramping).,  Disp: 30 tablet, Rfl: 1    ipratropium-albuterol (DUO-NEB) 0.5-2.5 mg/3 ml nebulizer, , Disp: , Rfl:     levothyroxine (SYNTHROID, LEVOTHROID) 100 MCG tablet, Take 1 tablet by mouth Daily., Disp: , Rfl:     lisinopril (PRINIVIL,ZESTRIL) 20 MG tablet, Take 1 tablet by mouth Daily., Disp: , Rfl:     montelukast (SINGULAIR) 10 MG tablet, Take 1 tablet by mouth Every Night., Disp: , Rfl:     nystatin (MYCOSTATIN) 045198 UNIT/GM powder, Apply  topically to the appropriate area as directed 2 (Two) Times a Day., Disp: 30 g, Rfl: 1    omeprazole (priLOSEC) 40 MG capsule, Take 1 capsule by mouth Daily., Disp: , Rfl:     phenazopyridine (PYRIDIUM) 100 MG tablet, Take 1 tablet by mouth Daily As Needed (urinary burning)., Disp: 5 tablet, Rfl: 0    rizatriptan (MAXALT) 10 MG tablet, , Disp: , Rfl:     rosuvastatin (CRESTOR) 5 MG tablet, TAKE ONE (1) TABLET BY MOUTH EVERY NIGHT AT BEDTIME, Disp: , Rfl:     topiramate (TOPAMAX) 50 MG tablet, Take 2 tablets by mouth Daily., Disp: , Rfl:     traZODone (DESYREL) 150 MG tablet, Take 1 tablet by mouth Every Night., Disp: , Rfl:     Trelegy Ellipta 100-62.5-25 MCG/INH inhaler, Inhale 1 puff Daily., Disp: , Rfl:     Allergies   Allergen Reactions    Compazine [Prochlorperazine] Itching    Demerol [Meperidine] Itching and Nausea Only    Cephalexin Rash and Other (See Comments)     Objective   There were no vitals taken for this visit.     There is no height or weight on file to calculate BMI.     Physical Exam  Constitutional:       Comments: Visibly uncomfortable   Genitourinary:     Comments: Bladder palpable  Neurological:      Mental Status: She is alert.          Labs  Lab Results   Component Value Date    COLORU Yellow 01/08/2025    CLARITYU Clear 01/08/2025    SPECGRAV 1.020 01/08/2025    PHUR 7.0 01/08/2025    LEUKOCYTESUR Negative 01/08/2025    NITRITE Negative 01/08/2025    PROTEINPOCUA Negative 01/08/2025    GLUCOSEUR Negative 01/08/2025    KETONESU Negative 01/08/2025     "UROBILINOGEN 0.2 E.U./dL 01/08/2025    BILIRUBINUR Negative 01/08/2025    RBCUR Negative 01/08/2025      No results found for: \"WBCUA\", \"RBCUA\", \"BACTERIA\", \"HYALCASTU\", \"SQUAMEPIUA\"     Lab Results   Component Value Date    WBC 14.40 (H) 01/08/2020    HGB 14.3 01/08/2020    HCT 42.0 01/08/2020    MCV 90.5 01/08/2020     01/08/2020     Lab Results   Component Value Date    GLUCOSE 102 (H) 12/27/2021    CALCIUM 8.8 12/27/2021     (L) 12/27/2021    K 3.6 12/27/2021    CO2 23.6 12/27/2021     12/27/2021    BUN 17 12/27/2021    BUN 10 01/08/2020    CREATININE 0.70 06/17/2022    CREATININE 0.91 12/27/2021    BCR 18.7 12/27/2021    ANIONGAP 5.4 12/27/2021    ALT 14 12/27/2021    AST 19 12/27/2021     Lab Results   Component Value Date    HGBA1C 5.80 (H) 01/08/2020     No results found for: \"URICACIDSTN\", \"YWNQ4ORCHRT\", \"HGCD5XLHAY\", \"LABMAGN\"  No results found for: \"HAXB28BX\", \"CAION\", \"PTH\", \"URICACID\"  No results found for: \"CYSTINE\", \"URINEVOLUM\", \"CALCIUMUR\", \"OXALATEU\", \"CITRATEUR\", \"LABPH\", \"URICUR\", \"NAUR\", \"KUR\", \"MAGNESIUMUR\", \"PHOSUR\", \"AMMONIUMUR\", \"CLUR\", \"WKTVCYUMT21K\", \"UREAUR\", \"LABCREAUR\"    No results found for: \"ATOPOBIUMV\", \"BVAB2\", \"MEGASPHAER\", \"CALBICANSN\", \"CGLABRATAN\", \"CPARAPSILOS\", \"CLUSITANIAE\", \"CKRUSEI\", \"TRICHVAG\", \"CHLAMNAA\", \"NGONORRHON\", \"UREAPLASMA\", \"MYCOPLASMAG\"    Lab Results   Component Value Date    TSH 0.544 01/08/2020         Radiographic Studies  No Images in the past 120 days found..    I have reviewed the above labs and imaging.     PVR  Post-void residual performed with ultrasound by staff and interpreted by me - 757 mL and 800 mL drained after catheter was placed    Assessment / Plan      There are no diagnoses linked to this encounter.     Assessment & Plan  1. Urinary retention: Post-surgery.  - Catheterization performed to alleviate retention.  - Plan to keep the catheter in place until tomorrow and will return for a fill and void    Follow-up  - Follow " up tomorrow for a voiding trial.    PROCEDURE  The patient underwent a surgical procedure prior to this visit this am.       Return Wednesday for a nurse visit.    Xenia Bajwa, MSN, APRN, FNP-C  [facility]

## 2025-03-18 NOTE — PROGRESS NOTES
Patient came in tosay she had a surgery with Dr. Galdamez this morning 03/18/2025 and she went home and she went into urinary retention. I PVR patient and it was 757 ML so I placed a 14 Upper sorbian cathter into patient and drained out 800 ML out. Patient will come in tomorrow 03/19/2025 for a fill n void.     Cheri KENDRICK

## 2025-03-19 ENCOUNTER — PROCEDURE VISIT (OUTPATIENT)
Dept: UROLOGY | Facility: CLINIC | Age: 75
End: 2025-03-19
Payer: MEDICARE

## 2025-03-19 DIAGNOSIS — R33.8 ACUTE URINARY RETENTION: Primary | ICD-10-CM

## 2025-03-19 NOTE — PROGRESS NOTES
Patient came into clinic today for a fill and void    Balloon was deflated and I inserted 150 cc into her bladder and removed the catheter patient was able to urinate and PVR WAS 0ML     PATIENT ADVISED IF SHE COULD NOT URINATE BY 2PM TO COME BACK TO CLINIC.    Nereyda,CMA

## 2025-03-27 ENCOUNTER — TRANSCRIBE ORDERS (OUTPATIENT)
Dept: ADMINISTRATIVE | Facility: HOSPITAL | Age: 75
End: 2025-03-27
Payer: MEDICARE

## 2025-03-27 DIAGNOSIS — M81.0 AGE-RELATED OSTEOPOROSIS WITHOUT CURRENT PATHOLOGICAL FRACTURE: Primary | ICD-10-CM

## 2025-04-01 ENCOUNTER — OFFICE VISIT (OUTPATIENT)
Dept: UROLOGY | Facility: CLINIC | Age: 75
End: 2025-04-01
Payer: MEDICARE

## 2025-04-01 VITALS
WEIGHT: 153 LBS | OXYGEN SATURATION: 98 % | BODY MASS INDEX: 27.11 KG/M2 | HEART RATE: 57 BPM | TEMPERATURE: 97 F | SYSTOLIC BLOOD PRESSURE: 130 MMHG | DIASTOLIC BLOOD PRESSURE: 80 MMHG | HEIGHT: 63 IN

## 2025-04-01 DIAGNOSIS — R33.8 ACUTE URINARY RETENTION: Primary | ICD-10-CM

## 2025-04-01 DIAGNOSIS — N36.42 URETHRAL SPHINCTER DEFICIENCY, INTRINSIC (ISD): ICD-10-CM

## 2025-04-01 DIAGNOSIS — N39.41 URGE INCONTINENCE: ICD-10-CM

## 2025-04-01 DIAGNOSIS — N95.8 GENITOURINARY SYNDROME OF MENOPAUSE: ICD-10-CM

## 2025-04-01 LAB
BILIRUB BLD-MCNC: NEGATIVE MG/DL
CLARITY, POC: CLEAR
COLOR UR: YELLOW
EXPIRATION DATE: NORMAL
GLUCOSE UR STRIP-MCNC: NEGATIVE MG/DL
KETONES UR QL: NEGATIVE
LEUKOCYTE EST, POC: NEGATIVE
Lab: NORMAL
NITRITE UR-MCNC: NEGATIVE MG/ML
PH UR: 5 [PH] (ref 5–8)
PROT UR STRIP-MCNC: NEGATIVE MG/DL
RBC # UR STRIP: NEGATIVE /UL
SP GR UR: 1.01 (ref 1–1.03)
UROBILINOGEN UR QL: NORMAL

## 2025-04-01 RX ORDER — PHENAZOPYRIDINE HYDROCHLORIDE 100 MG/1
100 TABLET, FILM COATED ORAL DAILY PRN
Qty: 20 TABLET | Refills: 0 | Status: SHIPPED | OUTPATIENT
Start: 2025-04-01

## 2025-04-01 NOTE — PROGRESS NOTES
Office Visit     Patient Name: Jessi Fletcher  : 1950   MRN: [id number]     Patient or patient representative verbalized consent for the use of Ambient Listening during the visit with  OZZY Evans for chart documentation. 2025  11:39 EDT    Chief Complaint:   Chief Complaint   Patient presents with    bulkamid follow up     Referring Provider: No ref. provider found    Primary Care Provider: Elsy Chung MD     History of Present Illness  The patient presents for evaluation of urinary incontinence.    Urinary Incontinence  - She experiences occasional urinary incontinence, with episodes occurring once or twice a week, typically when she is unable to reach the bathroom in time.  - No stress incontinence since Bulkamid procedure and her initial urinary retention after Bulkamid is resolved  - She is currently on program 3 at 0.5.  - She is requesting a refill of her Pyridium prescription.          Subjective   Review of System:   As noted in HPI.    Past Medical History:   Diagnosis Date    Chronic pain syndrome     COPD (chronic obstructive pulmonary disease)     Disease of thyroid gland     GERD (gastroesophageal reflux disease)     Hyperlipidemia     Hypertension     Irritable bowel disease     Migraines     Mixed stress and urge urinary incontinence     PONV (postoperative nausea and vomiting)     Wears glasses      Past Surgical History:   Procedure Laterality Date    BREAST BIOPSY      BREAST SURGERY      biopsy    COLONOSCOPY N/A 2022    Procedure: COLONOSCOPY, polypectomy x2 and biopsies;  Surgeon: Delgado Morales MD;  Location: [facility] ENDOSCOPY;  Service: Gastroenterology;  Laterality: N/A;    ENDOSCOPY N/A 2022    Procedure: ESOPHAGOGASTRODUODENOSCOPY WITH BIOPSY;  Surgeon: Delgado Morales MD;  Location: [facility] ENDOSCOPY;  Service: Gastroenterology;  Laterality: N/A;    HYSTERECTOMY  1988    INTERSTIM PLACEMENT  2024    Leads are NOT  MRI compatible.  InterStim placed in 2008 with battery change in 2016.  IPG exchange 12/03/24    OOPHORECTOMY       Family History   Problem Relation Age of Onset    Cancer Mother     Asthma Father     Heart attack Father     Diabetes Sister     Cancer Brother     Cancer Brother     Cancer Brother     Breast cancer Maternal Aunt     Colon cancer Neg Hx      Social History     Socioeconomic History    Marital status:    Tobacco Use    Smoking status: Never     Passive exposure: Never    Smokeless tobacco: Never   Vaping Use    Vaping status: Never Used   Substance and Sexual Activity    Alcohol use: Yes     Comment: social    Drug use: Never    Sexual activity: Defer     Birth control/protection: Post-menopausal, Hysterectomy       Current Outpatient Medications:     albuterol sulfate  (90 Base) MCG/ACT inhaler, , Disp: , Rfl:     carvedilol (COREG) 3.125 MG tablet, Take 1 tablet by mouth 2 (Two) Times a Day With Meals., Disp: , Rfl:     estradiol (ESTRACE) 0.1 MG/GM vaginal cream, Apply 1 g two nights weekly at bed time, Disp: 42.5 g, Rfl: 3    fluticasone (FLONASE) 50 MCG/ACT nasal spray, , Disp: , Rfl:     gabapentin (NEURONTIN) 800 MG tablet, Take 1 tablet by mouth Daily., Disp: , Rfl:     levothyroxine (SYNTHROID, LEVOTHROID) 100 MCG tablet, Take 1 tablet by mouth Daily., Disp: , Rfl:     lisinopril (PRINIVIL,ZESTRIL) 20 MG tablet, Take 1 tablet by mouth Daily., Disp: , Rfl:     montelukast (SINGULAIR) 10 MG tablet, Take 1 tablet by mouth Every Night., Disp: , Rfl:     nystatin (MYCOSTATIN) 507111 UNIT/GM powder, Apply  topically to the appropriate area as directed 2 (Two) Times a Day., Disp: 30 g, Rfl: 1    omeprazole (priLOSEC) 40 MG capsule, Take 1 capsule by mouth Daily., Disp: , Rfl:     phenazopyridine (PYRIDIUM) 100 MG tablet, Take 1 tablet by mouth Daily As Needed (urinary burning)., Disp: 20 tablet, Rfl: 0    rizatriptan (MAXALT) 10 MG tablet, , Disp: , Rfl:     rosuvastatin (CRESTOR) 5 MG  "tablet, TAKE ONE (1) TABLET BY MOUTH EVERY NIGHT AT BEDTIME, Disp: , Rfl:     topiramate (TOPAMAX) 50 MG tablet, Take 2 tablets by mouth Daily., Disp: , Rfl:     traZODone (DESYREL) 150 MG tablet, Take 1 tablet by mouth Every Night., Disp: , Rfl:     Trelegy Ellipta 100-62.5-25 MCG/INH inhaler, Inhale 1 puff Daily., Disp: , Rfl:     cholestyramine (QUESTRAN) 4 GM/DOSE powder, MIX ONE (1) SCOOP WITH NON CARBONATED LIQUID AND DRINK TWICE A DAY (Patient not taking: Reported on 4/1/2025), Disp: , Rfl:     colestipol (COLESTID) 1 g tablet, Take 2 tablets twice a day by oral route as needed for 30 days. (Patient not taking: Reported on 4/1/2025), Disp: , Rfl:     denosumab (Prolia) 60 MG/ML solution prefilled syringe syringe, Inject 1 mL under the skin into the appropriate area as directed 1 (One) Time for 1 dose., Disp: 1 mL, Rfl: 0    fluconazole (DIFLUCAN) 150 MG tablet, Take 1 tablet by mouth Daily. (Patient not taking: Reported on 4/1/2025), Disp: , Rfl:     glycopyrrolate (ROBINUL) 1 MG tablet, Take 1 tablet by mouth 2 (Two) Times a Day As Needed (lower abdominal cramping). (Patient not taking: Reported on 4/1/2025), Disp: 30 tablet, Rfl: 1    ipratropium-albuterol (DUO-NEB) 0.5-2.5 mg/3 ml nebulizer, , Disp: , Rfl:     Allergies   Allergen Reactions    Compazine [Prochlorperazine] Itching    Demerol [Meperidine] Itching and Nausea Only    Cephalexin Rash and Other (See Comments)     Objective   Visit Vitals  /80   Pulse 57   Temp 97 °F (36.1 °C)   Ht 160 cm (63\")   Wt 69.4 kg (153 lb)   SpO2 98%   BMI 27.10 kg/m²        Body mass index is 27.1 kg/m².     Physical Exam  Vitals and nursing note reviewed.   Constitutional:       General: She is not in acute distress.     Appearance: Normal appearance. She is normal weight. She is not ill-appearing.   Pulmonary:      Effort: Pulmonary effort is normal. No respiratory distress.   Skin:     General: Skin is warm and dry.   Neurological:      General: No focal " "deficit present.      Mental Status: She is alert and oriented to person, place, and time.   Psychiatric:         Mood and Affect: Mood normal.         Behavior: Behavior normal.          Physical Exam         Labs  Lab Results   Component Value Date    COLORU Yellow 04/01/2025    CLARITYU Clear 04/01/2025    SPECGRAV 1.015 04/01/2025    PHUR 5.0 04/01/2025    LEUKOCYTESUR Negative 04/01/2025    NITRITE Negative 04/01/2025    PROTEINPOCUA Negative 04/01/2025    GLUCOSEUR Negative 04/01/2025    KETONESU Negative 04/01/2025    UROBILINOGEN 0.2 E.U./dL 04/01/2025    BILIRUBINUR Negative 04/01/2025    RBCUR Negative 04/01/2025      No results found for: \"WBCUA\", \"RBCUA\", \"BACTERIA\", \"HYALCASTU\", \"SQUAMEPIUA\"     Lab Results   Component Value Date    WBC 14.40 (H) 01/08/2020    HGB 14.3 01/08/2020    HCT 42.0 01/08/2020    MCV 90.5 01/08/2020     01/08/2020     Lab Results   Component Value Date    GLUCOSE 102 (H) 12/27/2021    CALCIUM 8.8 12/27/2021     (L) 12/27/2021    K 3.6 12/27/2021    CO2 23.6 12/27/2021     12/27/2021    BUN 17 12/27/2021    BUN 10 01/08/2020    CREATININE 0.70 06/17/2022    CREATININE 0.91 12/27/2021    BCR 18.7 12/27/2021    ANIONGAP 5.4 12/27/2021    ALT 14 12/27/2021    AST 19 12/27/2021     Lab Results   Component Value Date    HGBA1C 5.80 (H) 01/08/2020     No results found for: \"URICACIDSTN\", \"OKAE3KUXRWU\", \"GHRJ6KDJIN\", \"LABMAGN\"  No results found for: \"ISZD60XV\", \"CAION\", \"PTH\", \"URICACID\"  No results found for: \"CYSTINE\", \"URINEVOLUM\", \"CALCIUMUR\", \"OXALATEU\", \"CITRATEUR\", \"LABPH\", \"URICUR\", \"NAUR\", \"KUR\", \"MAGNESIUMUR\", \"PHOSUR\", \"AMMONIUMUR\", \"CLUR\", \"[id number]\", \"UREAUR\", \"LABCREAUR\"    No results found for: \"ATOPOBIUMV\", \"BVAB2\", \"MEGASPHAER\", \"CALBICANSN\", \"CGLABRATAN\", \"CPARAPSILOS\", \"CLUSITANIAE\", \"CKRUSEI\", \"TRICHVAG\", \"CHLAMNAA\", \"NGONORRHON\", \"UREAPLASMA\", \"MYCOPLASMAG\"    Lab Results   Component Value Date    TSH 0.544 01/08/2020         Radiographic " Studies  No Images in the past 120 days found..    I have reviewed the above labs and imaging.     PVR  Post-void residual performed with ultrasound by staff and interpreted by me - 0 mL    Assessment / Plan      Diagnoses and all orders for this visit:    1. Acute urinary retention (Primary)  -     POC Urinalysis Dipstick, Automated    2. Urethral sphincter deficiency, intrinsic (ISD)    3. Urge incontinence    4. Genitourinary syndrome of menopause  -     phenazopyridine (PYRIDIUM) 100 MG tablet; Take 1 tablet by mouth Daily As Needed (urinary burning).  Dispense: 20 tablet; Refill: 0         Assessment & Plan  1. Acute Urinary retention: resolved patient passed voiding trial 24 hours after Bulkamid procedure    2.  Intrinsic sphincter deficiency:  Stable. Bulkamid treatment is expected to last 5-7 years.  -PVR today 0ml    3. Urge incontinence:  -Simple InterStim programming today program was changed from program 3 at 0.5 mA program 2 at 1.1 mA sensation felt in bicycle seat with new program  - Contact [facility] if symptoms persist for assistance in adjusting the program    4.  Genitourinary syndrome of menopause:   - Continue using estrogen cream 1g 2 nights weekly  - Prescription for Pyridium use PRN for bladder spasms and urethral pain    Follow-up in 6 months    PROCEDURE  Bulkamid and Interstim        Return in about 6 months (around 10/1/2025) for Hilary Michaels.    Xenia Bajwa, MSN, APRN, FNP-C  [facility] [place]

## 2025-04-03 ENCOUNTER — OFFICE VISIT (OUTPATIENT)
Dept: GASTROENTEROLOGY | Facility: CLINIC | Age: 75
End: 2025-04-03
Payer: MEDICARE

## 2025-04-03 VITALS
OXYGEN SATURATION: 98 % | WEIGHT: 152 LBS | BODY MASS INDEX: 26.93 KG/M2 | HEART RATE: 76 BPM | SYSTOLIC BLOOD PRESSURE: 120 MMHG | DIASTOLIC BLOOD PRESSURE: 80 MMHG

## 2025-04-03 DIAGNOSIS — K92.9 FUNCTIONAL GASTROINTESTINAL DISORDER: ICD-10-CM

## 2025-04-03 DIAGNOSIS — K58.0 IRRITABLE BOWEL SYNDROME WITH DIARRHEA: Primary | Chronic | ICD-10-CM

## 2025-04-03 DIAGNOSIS — Z86.0100 PERSONAL HISTORY OF COLON POLYPS, UNSPECIFIED: ICD-10-CM

## 2025-04-03 DIAGNOSIS — R10.30 LOWER ABDOMINAL PAIN: Chronic | ICD-10-CM

## 2025-04-03 DIAGNOSIS — R19.7 DIARRHEA, UNSPECIFIED TYPE: Chronic | ICD-10-CM

## 2025-04-03 DIAGNOSIS — K21.9 GASTROESOPHAGEAL REFLUX DISEASE WITHOUT ESOPHAGITIS: Chronic | ICD-10-CM

## 2025-04-03 PROCEDURE — 3079F DIAST BP 80-89 MM HG: CPT | Performed by: NURSE PRACTITIONER

## 2025-04-03 PROCEDURE — 1160F RVW MEDS BY RX/DR IN RCRD: CPT | Performed by: NURSE PRACTITIONER

## 2025-04-03 PROCEDURE — 1159F MED LIST DOCD IN RCRD: CPT | Performed by: NURSE PRACTITIONER

## 2025-04-03 PROCEDURE — 3074F SYST BP LT 130 MM HG: CPT | Performed by: NURSE PRACTITIONER

## 2025-04-03 PROCEDURE — 99214 OFFICE O/P EST MOD 30 MIN: CPT | Performed by: NURSE PRACTITIONER

## 2025-04-03 RX ORDER — CHOLESTYRAMINE LIGHT 4 G/5.7G
4 POWDER, FOR SUSPENSION ORAL 2 TIMES DAILY
Qty: 239.4 G | Refills: 5 | Status: SHIPPED | OUTPATIENT
Start: 2025-04-03

## 2025-04-03 NOTE — PATIENT INSTRUCTIONS
Antireflux measures: Avoid fried, fatty foods, alcohol, chocolate, coffee, tea,  soft drinks, all carbonated beverages (including sparkling water), peppermint and spearmint, spicy foods, tomatoes and tomato based foods, onions, peppers, and garlic.   Other antireflux measures include weight reduction if overweight, avoiding tight clothing around the abdomen, elevating the head of the bed 6 inches with blocks under the head board, and don't drink or eat before going to bed and avoid lying down immediately after meals.  Omeprazole 20 mg 1 by mouth in the am 30 minutes before breakfast.  Advised to take Levothyroxine first in the morning, wait 30 minutes, then take Omeprazole, wait 30 minutes, then take other medications and eat breakfast.  Zofran 4 mg 1 po every 8 hours as needed for nausea.  High fiber, low fat diet with liberal water intake.   Metamucil 1 packet/scoop daily or fiber gummies 2-4 per day.   Low FODMAP diet - avoid all dairy. May use lactose free/dairy free alternatives such as almond milk, rice milk, oat milk, etc.   Cholestyramine powder 1 scoop twice a day. Adjust dose to have 1-2 soft bowel movements daily.   Imodium 2 mg 1/2-1 tablet 1-2 times per day as needed for diarrhea.   Robinul 1 mg 1 po twice a day as needed for cramping.   Colonoscopy for surveillance in September 2029.   Follow up: 6 months             Low-FODMAP Eating Plan    FODMAP stands for fermentable oligosaccharides, disaccharides, monosaccharides, and polyols. These are sugars that are hard for some people to digest. A low-FODMAP eating plan may help some people who have irritable bowel syndrome (IBS) and certain other bowel (intestinal) diseases to manage their symptoms.  This meal plan can be complicated to follow. Work with a diet and nutrition specialist (dietitian) to make a low-FODMAP eating plan that is right for you. A dietitian can help make sure that you get enough nutrition from this diet.  What are tips for following  this plan?  Reading food labels  Check labels for hidden FODMAPs such as:  High-fructose syrup.  Honey.  Agave.  Natural fruit flavors.  Onion or garlic powder.  Choose low-FODMAP foods that contain 3-4 grams of fiber per serving.  Check food labels for serving sizes. Eat only one serving at a time to make sure FODMAP levels stay low.  Shopping  Shop with a list of foods that are recommended on this diet and make a meal plan.  Meal planning  Follow a low-FODMAP eating plan for up to 6 weeks, or as told by your health care provider or dietitian.  To follow the eating plan:  Eliminate high-FODMAP foods from your diet completely. Choose only low-FODMAP foods to eat. You will do this for 2-6 weeks.  Gradually reintroduce high-FODMAP foods into your diet one at a time. Most people should wait a few days before introducing the next new high-FODMAP food into their meal plan. Your dietitian can recommend how quickly you may reintroduce foods.  Keep a daily record of what and how much you eat and drink. Make note of any symptoms that you have after eating.  Review your daily record with a dietitian regularly to identify which foods you can eat and which foods you should avoid.  General tips  Drink enough fluid each day to keep your urine pale yellow.  Avoid processed foods. These often have added sugar and may be high in FODMAPs.  Avoid most dairy products, whole grains, and sweeteners.  Work with a dietitian to make sure you get enough fiber in your diet.  Avoid high FODMAP foods at meals to manage symptoms.     Recommended foods  Fruits  Bananas, oranges, tangerines, sudhakar, limes, blueberries, raspberries, strawberries, grapes, cantaloupe, honeydew melon, kiwi, papaya, passion fruit, and pineapple. Limited amounts of dried cranberries, banana chips, and shredded coconut.  Vegetables  Eggplant, zucchini, cucumber, peppers, green beans, bean sprouts, lettuce, arugula, kale, Swiss chard, spinach, gilberto greens, bok gloria,  "summer squash, potato, and tomato. Limited amounts of corn, carrot, and sweet potato. Green parts of scallions.  Grains  Gluten-free grains, such as rice, oats, buckwheat, quinoa, corn, polenta, and millet. Gluten-free pasta, bread, or cereal. Rice noodles. Corn tortillas.  Meats and other proteins  Unseasoned beef, pork, poultry, or fish. Eggs. [place]. Tofu (firm) and tempeh. Limited amounts of nuts and seeds, such as almonds, walnuts, brazil nuts, pecans, peanuts, nut butters, pumpkin seeds, earline seeds, and sunflower seeds.  Dairy  Lactose-free milk, yogurt, and kefir. Lactose-free cottage cheese and ice cream. Non-dairy milks, such as almond, coconut, hemp, and rice milk. Non-dairy yogurt. Limited amounts of goat cheese, brie, mozzarella, parmesan, swiss, and other hard cheeses.  Fats and oils  Butter-free spreads. Vegetable oils, such as olive, canola, and sunflower oil.  Seasoning and other foods  Artificial sweeteners with names that do not end in \"ol,\" such as aspartame, saccharine, and stevia. Maple syrup, white table sugar, raw sugar, brown sugar, and molasses. Mayonnaise, soy sauce, and tamari. Fresh basil, coriander, parsley, rosemary, and thyme.  Beverages  Water and mineral water. Sugar-sweetened soft drinks. Small amounts of orange juice or cranberry juice. Black and green tea. Most dry tala. Coffee.  The items listed above may not be a complete list of foods and beverages you can eat. Contact a dietitian for more information.     Foods to avoid  Fruits  Fresh, dried, and juiced forms of apple, pear, watermelon, peach, plum, cherries, apricots, blackberries, boysenberries, figs, nectarines, and alba. Avocado.  Vegetables  Chicory root, artichoke, asparagus, cabbage, snow peas, [place] sprouts, broccoli, sugar snap peas, mushrooms, celery, and cauliflower. Onions, garlic, leeks, and the white part of scallions.  Grains  Wheat, including kamut, durum, and semolina. Barley and bulgur. Couscous. " Wheat-based cereals. Wheat noodles, bread, crackers, and pastries.  Meats and other proteins  Fried or fatty meat. Sausage. Cashews and pistachios. Soybeans, baked beans, black beans, chickpeas, kidney beans, juan carlso beans, navy beans, lentils, black-eyed peas, and split peas.  Dairy  Milk, yogurt, ice cream, and soft cheese. Cream and sour cream. Milk-based sauces. Custard. Buttermilk. Soy milk.  Seasoning and other foods  Any sugar-free gum or candy. Foods that contain artificial sweeteners such as sorbitol, mannitol, isomalt, or xylitol. Foods that contain honey, high-fructose corn syrup, or agave. Bouillon, vegetable stock, beef stock, and chicken stock. Garlic and onion powder. Condiments made with onion, such as hummus, chutney, pickles, relish, salad dressing, and salsa. Tomato paste.  Beverages  Chicory-based drinks. Coffee substitutes. Chamomile tea. Fennel tea. Sweet or fortified tala such as port or jenna. Diet soft drinks made with isomalt, mannitol, maltitol, sorbitol, or xylitol. Apple, pear, and alba juice. Juices with high-fructose corn syrup.  The items listed above may not be a complete list of foods and beverages you should avoid. Contact a dietitian for more information.     Summary  FODMAP stands for fermentable oligosaccharides, disaccharides, monosaccharides, and polyols. These are sugars that are hard for some people to digest.  A low-FODMAP eating plan is a short-term diet that helps to ease symptoms of certain bowel diseases.  The eating plan usually lasts up to 6 weeks. After that, high-FODMAP foods are reintroduced gradually and one at a time. This can help you find out which foods may be causing symptoms.  A low-FODMAP eating plan can be complicated. It is best to work with a dietitian who has experience with this type of plan.  This information is not intended to replace advice given to you by your health care provider. Make sure you discuss any questions you have with your health care  provider.  Document Revised: 05/06/2021 Document Reviewed: 05/06/2021  [employer] Patient Education © 2023 [employer]

## 2025-04-03 NOTE — PROGRESS NOTES
Follow Up Note     Date: 2025   Patient Name: Jessi Fletcher  MRN: [id number]  : 1950     Primary Care Provider: Vita Ordonez APRN     Chief Complaint   Patient presents with    Diarrhea     4/3/2025  History of Present Illness  The patient is a 74-year-old female who is here for follow-up of diarrhea.    She reports persistent abdominal cramping, with no significant change in her condition. She has identified certain foods as triggers for her symptoms, which predominantly manifest in the form of 4 to 5 loose bowel movements in the morning. As the day progresses, her condition tends to stabilize.  She has observed that consumption of oats with milk, meatloaf, and baked potatoes with butter and sour cream exacerbate her cramping. She has been managing her symptoms with antidiarrheal medication, specifically Imodium, which she finds effective. However, she experiences drowsiness as a side effect of Bentyl, leading her to avoid its use. A previous trial of Xifaxan did not yield any noticeable improvement in her condition. She has also tried cholestyramine twice a day but found it to cause constipation.     Interval History:  2024  Jessi Fletcher is a 73 y.o. female who is here today for follow up for IBS. She has been having worsening of IBS flare ups over the past couple of months. She has been having 4-5 loose bowel movements per day. She is having worsening of lower abdominal cramping. She denies traveling, taking antibiotics or being around anyone ill. She denies any GI bleeding. She is not able to take Bentyl as it makes her symptoms worse. Her PCP gave her Colestipol, but it made her cramping and diarrhea worse so she stopped it. She has taken Imodium and it helps with the diarrhea and cramping, but she doesn't want to have to take it every day. She is going today to get labs at her PCP office. Reflux reasonably controlled with PPI. No trouble swallowing.      2022  She has a  "history of \"IBS with diarrhea\" for many years, but for the past 2-3 weeks her symptoms have worsened. She has been having lower abdominal pain with increased pain in the LLQ that has been more severe than usual, she had a difficult time walking or sitting due to the pain. She feels like she has a mass or pocket in her LLQ that is making her symptoms worse. She was prescribed Bentyl yesterday and it did help. She has been having increased diarrhea with 4-5 episodes of loose to watery stools. She took Imodium to slow down diarrhea yesterday. Denies rectal bleeding. She has associated nausea, but no vomiting. She has had chills for the past 2 days. She has labs and stool studies ordered by PCP yesterday, but has not yet had them done.      She has a history of reflux for several years and is taking Omeprazole 40 mg daily with reasonable control. No difficulty swallowing.      Her last colonoscopy and EGD was >10 years ago. She had polyps in the past. She did Cologuard a few years ago that was \"normal\" per patient.     Subjective      Past Medical History:   Diagnosis Date    Chronic pain syndrome     COPD (chronic obstructive pulmonary disease)     Disease of thyroid gland     GERD (gastroesophageal reflux disease)     Hyperlipidemia     Hypertension     Irritable bowel disease     Migraines     Mixed stress and urge urinary incontinence     PONV (postoperative nausea and vomiting)     Wears glasses      Past Surgical History:   Procedure Laterality Date    BREAST BIOPSY      BREAST SURGERY  1980    biopsy    COLONOSCOPY N/A 09/09/2022    Procedure: COLONOSCOPY, polypectomy x2 and biopsies;  Surgeon: Delgado Morales MD;  Location: [facility] ENDOSCOPY;  Service: Gastroenterology;  Laterality: N/A;    ENDOSCOPY N/A 09/09/2022    Procedure: ESOPHAGOGASTRODUODENOSCOPY WITH BIOPSY;  Surgeon: Delgado Morales MD;  Location: [facility] ENDOSCOPY;  Service: Gastroenterology;  Laterality: N/A;    HYSTERECTOMY  1988    " INTERSTIM PLACEMENT  12/03/2024    Leads are NOT MRI compatible.  InterStim placed in 2008 with battery change in 2016.  IPG exchange 12/03/24    OOPHORECTOMY       Family History   Problem Relation Age of Onset    Cancer Mother     Asthma Father     Heart attack Father     Diabetes Sister     Cancer Brother     Cancer Brother     Cancer Brother     Breast cancer Maternal Aunt     Colon cancer Neg Hx      Social History     Socioeconomic History    Marital status:    Tobacco Use    Smoking status: Never     Passive exposure: Never    Smokeless tobacco: Never   Vaping Use    Vaping status: Never Used   Substance and Sexual Activity    Alcohol use: Yes     Comment: social    Drug use: Never    Sexual activity: Defer     Birth control/protection: Post-menopausal, Hysterectomy       Current Outpatient Medications:     albuterol sulfate  (90 Base) MCG/ACT inhaler, , Disp: , Rfl:     carvedilol (COREG) 3.125 MG tablet, Take 1 tablet by mouth 2 (Two) Times a Day With Meals., Disp: , Rfl:     estradiol (ESTRACE) 0.1 MG/GM vaginal cream, Apply 1 g two nights weekly at bed time, Disp: 42.5 g, Rfl: 3    fluticasone (FLONASE) 50 MCG/ACT nasal spray, , Disp: , Rfl:     gabapentin (NEURONTIN) 800 MG tablet, Take 1 tablet by mouth Daily., Disp: , Rfl:     glycopyrrolate (ROBINUL) 1 MG tablet, Take 1 tablet by mouth 2 (Two) Times a Day As Needed (lower abdominal cramping)., Disp: 30 tablet, Rfl: 1    ipratropium-albuterol (DUO-NEB) 0.5-2.5 mg/3 ml nebulizer, , Disp: , Rfl:     levothyroxine (SYNTHROID, LEVOTHROID) 100 MCG tablet, Take 1 tablet by mouth Daily., Disp: , Rfl:     lisinopril (PRINIVIL,ZESTRIL) 20 MG tablet, Take 1 tablet by mouth Daily., Disp: , Rfl:     montelukast (SINGULAIR) 10 MG tablet, Take 1 tablet by mouth Every Night., Disp: , Rfl:     nystatin (MYCOSTATIN) 154849 UNIT/GM powder, Apply  topically to the appropriate area as directed 2 (Two) Times a Day., Disp: 30 g, Rfl: 1    omeprazole (priLOSEC)  40 MG capsule, Take 1 capsule by mouth Daily., Disp: , Rfl:     phenazopyridine (PYRIDIUM) 100 MG tablet, Take 1 tablet by mouth Daily As Needed (urinary burning)., Disp: 20 tablet, Rfl: 0    rizatriptan (MAXALT) 10 MG tablet, , Disp: , Rfl:     rosuvastatin (CRESTOR) 5 MG tablet, TAKE ONE (1) TABLET BY MOUTH EVERY NIGHT AT BEDTIME, Disp: , Rfl:     topiramate (TOPAMAX) 50 MG tablet, Take 2 tablets by mouth Daily., Disp: , Rfl:     traZODone (DESYREL) 150 MG tablet, Take 1 tablet by mouth Every Night., Disp: , Rfl:     Trelegy Ellipta 100-62.5-25 MCG/INH inhaler, Inhale 1 puff Daily., Disp: , Rfl:     cholestyramine light (PREVALITE) 4 GM/DOSE powder, Take 1 packet by mouth 2 (Two) Times a Day. Decrease to once a day if having constipation., Disp: 239.4 g, Rfl: 5    denosumab (Prolia) 60 MG/ML solution prefilled syringe syringe, Inject 1 mL under the skin into the appropriate area as directed 1 (One) Time for 1 dose., Disp: 1 mL, Rfl: 0    Allergies   Allergen Reactions    Compazine [Prochlorperazine] Itching    Demerol [Meperidine] Itching and Nausea Only    Cephalexin Rash and Other (See Comments)     The following portions of the patient's history were reviewed and updated as appropriate: allergies, current medications, past family history, past medical history, past social history, past surgical history and problem list.  Objective     Physical Exam  Vitals and nursing note reviewed.   Constitutional:       General: She is not in acute distress.     Appearance: Normal appearance. She is well-developed.   HENT:      Head: Normocephalic and atraumatic.      Mouth/Throat:      Mouth: Mucous membranes are not pale, not dry and not cyanotic.   Eyes:      General: Lids are normal.   Neck:      Trachea: Trachea normal.   Cardiovascular:      Rate and Rhythm: Normal rate.   Pulmonary:      Effort: Pulmonary effort is normal. No respiratory distress.      Breath sounds: Normal breath sounds.   Abdominal:      Tenderness:  There is no abdominal tenderness.   Skin:     General: Skin is warm and dry.   Neurological:      Mental Status: She is alert and oriented to person, place, and time.   Psychiatric:         Mood and Affect: Mood normal.         Speech: Speech normal.         Behavior: Behavior normal. Behavior is cooperative.       Vitals:    04/03/25 1357   BP: 120/80   Pulse: 76   SpO2: 98%   Weight: 68.9 kg (152 lb)     Body mass index is 26.93 kg/m².     Results Review:   I reviewed the patient's new clinical results.     Dated 6/16/2022 H. pylori breath test: Negative     Dated 6/20/2022 iFOB: Negative, E. coli Shigella toxin: Negative, Giardia: Negative, O&P: Negative     CTAP with contrast dated 6/17/2022  No acute findings in the abdomen or pelvis to account for  patient's symptoms.     EGD dated 9/9/2022 per Dr. Morales  - Normal oropharynx.  - Z-line regular, 33 cm from the incisors.  - 3 cm hiatal hernia.  - No gross lesions in the entire esophagus. Biopsied.  - Erosive gastropathy with no stigmata of recent bleeding. Biopsied. Could be NSAID related  - Normal duodenal bulb, first portion of the duodenum, second portion of the duodenum and third portion of the duodenum. Biopsied.  A.   DUODENUM, BIOPSY:   No pathologic alterations   Negative for celiac disease, microorganisms, metaplasia or atypia   B.   GASTRIC BIOPSY:   Minimal chronic gastritis   Negative for H. pylori, metaplasia, dysplasia or malignancy   C.   ESOPHAGUS, BIOPSY:   Reflux esophagitis (no eosinophils identified)   Negative for metaplasia, dysplasia or malignancy     Colonoscopy dated 9/9/2022 per Dr. Morales  - One 6 mm polyp at the hepatic flexure, removed with a cold snare. Resected and retrieved. This could be inflammed hyperplastic mucosa  - One 3 mm polyp at the splenic flexure, removed with a cold snare. Resected and retrieved.  - The examined portion of the ileum was normal. Biopsied.  - Non-bleeding external and internal hemorrhoids.  -  Biopsies were taken with a cold forceps from the right colon, left colon and transverse colon for evaluation of change in bowel habit.  D.   TERMINAL ILEUM BIOPSY:   No pathologic alterations   E.   HEPATIC FLEXURE POLYP:   Benign mucosal polyp, negative for dysplasia   F.   SPLENIC FLEXURE POLYP:   Tubular adenoma fragments without high-grade dysplasia   G.   RANDOM COLON BIOPSIES:   No pathologic alterations   Negative for significant inflammation or atypia      US Abdomen limited      11/17/2023  Normal right upper quadrant ultrasound.     CTAP with contrast     8/29/2024  No bowel obstruction, bowel wall thickening or inflammatory  changes.    Dated 8/29/2024 IgA 135, tTg IgA <1.0  Dated 8/30/2024 stool culture: Negative, stool for C. difficile by PCR: Negative, Fecal calprotectin: 72, pancreatic elastase:  >500    Assessment / Plan      1. Functional gastrointestinal disorder  2. Irritable bowel syndrome with diarrhea  3. Diarrhea, unspecified type  4. Lower abdominal pain  Symptoms unchanged.  She took 14 days of Xifaxan, but symptoms are no better, but they are not worse.  Denies any GI bleeding.  Basic labs unremarkable.  Celiac panel normal.  Stool culture negative.  Stool for C. difficile negative.  Fecal calprotectin normal.  Pancreatic elastase normal.  CTAP with contrast dated 8/29/2024 unremarkable.  Colonoscopy dated 9/9/2022 unremarkable, TI and random biopsies unremarkable, no evidence of IBD or microscopic colitis.  Likely functional gastrointestinal disorder/IBS with diarrhea.  Low FODMAP diet  Cholestyramine powder twice a day-adjust dose to have 1-2 soft bowel movements per day.  Imodium as needed for breakthrough diarrhea.  Robinul 1 mg 1 p.o. twice a day as needed for cramping.    - cholestyramine light (PREVALITE) 4 GM/DOSE powder; Take 1 packet by mouth 2 (Two) Times a Day. Decrease to once a day if having constipation.  Dispense: 239.4 g; Refill: 5    5. Gastroesophageal reflux disease  without esophagitis  Reflux reasonably controlled with low-dose PPI daily.  No difficulty swallowing. EGD dated 9/9/2022 with erosive gastropathy with no bleeding, biopsies unremarkable.  Possibly NSAID related.  Antireflux measures.  Continue low-dose PPI daily.    6. Personal history of colon polyps, unspecified  Colonoscopy 9/9/2022 with polyps removed, 1 tubular adenoma without dysplasia.  There is no family history of colon cancer.  Colonoscopy for surveillance in 7 years, 2029.    Patient Instructions   Antireflux measures: Avoid fried, fatty foods, alcohol, chocolate, coffee, tea,  soft drinks, all carbonated beverages (including sparkling water), peppermint and spearmint, spicy foods, tomatoes and tomato based foods, onions, peppers, and garlic.   Other antireflux measures include weight reduction if overweight, avoiding tight clothing around the abdomen, elevating the head of the bed 6 inches with blocks under the head board, and don't drink or eat before going to bed and avoid lying down immediately after meals.  Omeprazole 20 mg 1 by mouth in the am 30 minutes before breakfast.  Advised to take Levothyroxine first in the morning, wait 30 minutes, then take Omeprazole, wait 30 minutes, then take other medications and eat breakfast.  Zofran 4 mg 1 po every 8 hours as needed for nausea.  High fiber, low fat diet with liberal water intake.   Metamucil 1 packet/scoop daily or fiber gummies 2-4 per day.   Low FODMAP diet - avoid all dairy. May use lactose free/dairy free alternatives such as almond milk, rice milk, oat milk, etc.   Cholestyramine powder 1 scoop twice a day. Adjust dose to have 1-2 soft bowel movements daily.   Imodium 2 mg 1/2-1 tablet 1-2 times per day as needed for diarrhea.   Robinul 1 mg 1 po twice a day as needed for cramping.   Colonoscopy for surveillance in September 2029.   Follow up: 6 months             Low-FODMAP Eating Plan    FODMAP stands for fermentable oligosaccharides,  disaccharides, monosaccharides, and polyols. These are sugars that are hard for some people to digest. A low-FODMAP eating plan may help some people who have irritable bowel syndrome (IBS) and certain other bowel (intestinal) diseases to manage their symptoms.  This meal plan can be complicated to follow. Work with a diet and nutrition specialist (dietitian) to make a low-FODMAP eating plan that is right for you. A dietitian can help make sure that you get enough nutrition from this diet.  What are tips for following this plan?  Reading food labels  Check labels for hidden FODMAPs such as:  High-fructose syrup.  Honey.  Agave.  Natural fruit flavors.  Onion or garlic powder.  Choose low-FODMAP foods that contain 3-4 grams of fiber per serving.  Check food labels for serving sizes. Eat only one serving at a time to make sure FODMAP levels stay low.  Shopping  Shop with a list of foods that are recommended on this diet and make a meal plan.  Meal planning  Follow a low-FODMAP eating plan for up to 6 weeks, or as told by your health care provider or dietitian.  To follow the eating plan:  Eliminate high-FODMAP foods from your diet completely. Choose only low-FODMAP foods to eat. You will do this for 2-6 weeks.  Gradually reintroduce high-FODMAP foods into your diet one at a time. Most people should wait a few days before introducing the next new high-FODMAP food into their meal plan. Your dietitian can recommend how quickly you may reintroduce foods.  Keep a daily record of what and how much you eat and drink. Make note of any symptoms that you have after eating.  Review your daily record with a dietitian regularly to identify which foods you can eat and which foods you should avoid.  General tips  Drink enough fluid each day to keep your urine pale yellow.  Avoid processed foods. These often have added sugar and may be high in FODMAPs.  Avoid most dairy products, whole grains, and sweeteners.  Work with a dietitian to  "make sure you get enough fiber in your diet.  Avoid high FODMAP foods at meals to manage symptoms.     Recommended foods  Fruits  Bananas, oranges, tangerines, sudhakar, limes, blueberries, raspberries, strawberries, grapes, cantaloupe, honeydew melon, kiwi, papaya, passion fruit, and pineapple. Limited amounts of dried cranberries, banana chips, and shredded coconut.  Vegetables  Eggplant, zucchini, cucumber, peppers, green beans, bean sprouts, lettuce, arugula, kale, Swiss chard, spinach, gilberto greens, bok gloria, summer squash, potato, and tomato. Limited amounts of corn, carrot, and sweet potato. Green parts of scallions.  Grains  Gluten-free grains, such as rice, oats, buckwheat, quinoa, corn, polenta, and millet. Gluten-free pasta, bread, or cereal. Rice noodles. Corn tortillas.  Meats and other proteins  Unseasoned beef, pork, poultry, or fish. Eggs. [place]ren[place]. Tofu (firm) and tempeh. Limited amounts of nuts and seeds, such as almonds, walnuts, brazil nuts, pecans, peanuts, nut butters, pumpkin seeds, earline seeds, and sunflower seeds.  Dairy  Lactose-free milk, yogurt, and kefir. Lactose-free cottage cheese and ice cream. Non-dairy milks, such as almond, coconut, hemp, and rice milk. Non-dairy yogurt. Limited amounts of goat cheese, brie, mozzarella, parmesan, swiss, and other hard cheeses.  Fats and oils  Butter-free spreads. Vegetable oils, such as olive, canola, and sunflower oil.  Seasoning and other foods  Artificial sweeteners with names that do not end in \"ol,\" such as aspartame, saccharine, and stevia. Maple syrup, white table sugar, raw sugar, brown sugar, and molasses. Mayonnaise, soy sauce, and tamari. Fresh basil, coriander, parsley, rosemary, and thyme.  Beverages  Water and mineral water. Sugar-sweetened soft drinks. Small amounts of orange juice or cranberry juice. Black and green tea. Most dry tala. Coffee.  The items listed above may not be a complete list of foods and beverages you can eat. " Contact a dietitian for more information.     Foods to avoid  Fruits  Fresh, dried, and juiced forms of apple, pear, watermelon, peach, plum, cherries, apricots, blackberries, boysenberries, figs, nectarines, and alba. Avocado.  Vegetables  Chicory root, artichoke, asparagus, cabbage, snow peas, [place] sprouts, broccoli, sugar snap peas, mushrooms, celery, and cauliflower. Onions, garlic, leeks, and the white part of scallions.  Grains  Wheat, including kamut, durum, and semolina. Barley and bulgur. Couscous. Wheat-based cereals. Wheat noodles, bread, crackers, and pastries.  Meats and other proteins  Fried or fatty meat. Sausage. Cashews and pistachios. Soybeans, baked beans, black beans, chickpeas, kidney beans, juan carlos beans, navy beans, lentils, black-eyed peas, and split peas.  Dairy  Milk, yogurt, ice cream, and soft cheese. Cream and sour cream. Milk-based sauces. Custard. Buttermilk. Soy milk.  Seasoning and other foods  Any sugar-free gum or candy. Foods that contain artificial sweeteners such as sorbitol, mannitol, isomalt, or xylitol. Foods that contain honey, high-fructose corn syrup, or agave. Bouillon, vegetable stock, beef stock, and chicken stock. Garlic and onion powder. Condiments made with onion, such as hummus, chutney, pickles, relish, salad dressing, and salsa. Tomato paste.  Beverages  Chicory-based drinks. Coffee substitutes. Chamomile tea. Fennel tea. Sweet or fortified tala such as port or jenna. Diet soft drinks made with isomalt, mannitol, maltitol, sorbitol, or xylitol. Apple, pear, and alba juice. Juices with high-fructose corn syrup.  The items listed above may not be a complete list of foods and beverages you should avoid. Contact a dietitian for more information.     Summary  FODMAP stands for fermentable oligosaccharides, disaccharides, monosaccharides, and polyols. These are sugars that are hard for some people to digest.  A low-FODMAP eating plan is a short-term diet that helps  to ease symptoms of certain bowel diseases.  The eating plan usually lasts up to 6 weeks. After that, high-FODMAP foods are reintroduced gradually and one at a time. This can help you find out which foods may be causing symptoms.  A low-FODMAP eating plan can be complicated. It is best to work with a dietitian who has experience with this type of plan.  This information is not intended to replace advice given to you by your health care provider. Make sure you discuss any questions you have with your health care provider.  Document Revised: 05/06/2021 Document Reviewed: 05/06/2021  [employer] Patient Education © 2023 [employer]     OZZY Swan  4/3/2025    Please note that portions of this note were completed with a voice recognition program.     Patient or patient representative verbalized consent for the use of Ambient Listening during the visit with  OZZY Swan for chart documentation. 4/3/2025  14:29 EDT

## 2025-05-23 ENCOUNTER — TRANSCRIBE ORDERS (OUTPATIENT)
Dept: ADMINISTRATIVE | Facility: HOSPITAL | Age: 75
End: 2025-05-23
Payer: MEDICARE

## 2025-05-23 DIAGNOSIS — R10.11 ABDOMINAL PAIN, RIGHT UPPER QUADRANT: Primary | ICD-10-CM

## 2025-05-23 DIAGNOSIS — I10 ESSENTIAL (PRIMARY) HYPERTENSION: Primary | ICD-10-CM

## 2025-06-12 ENCOUNTER — TRANSCRIBE ORDERS (OUTPATIENT)
Dept: ULTRASOUND IMAGING | Facility: HOSPITAL | Age: 75
End: 2025-06-12
Payer: MEDICARE

## 2025-06-12 ENCOUNTER — HOSPITAL ENCOUNTER (OUTPATIENT)
Dept: ULTRASOUND IMAGING | Facility: HOSPITAL | Age: 75
Discharge: HOME | End: 2025-06-12
Payer: MEDICARE

## 2025-06-12 DIAGNOSIS — R10.11 ABDOMINAL PAIN, RIGHT UPPER QUADRANT: Primary | ICD-10-CM

## 2025-06-12 DIAGNOSIS — R10.11 ABDOMINAL PAIN, RIGHT UPPER QUADRANT: ICD-10-CM

## 2025-06-12 PROCEDURE — 76705 ECHO EXAM OF ABDOMEN: CPT

## 2025-07-08 ENCOUNTER — TELEPHONE (OUTPATIENT)
Dept: UROLOGY | Facility: CLINIC | Age: 75
End: 2025-07-08

## 2025-07-08 DIAGNOSIS — N95.8 GENITOURINARY SYNDROME OF MENOPAUSE: ICD-10-CM

## 2025-07-08 RX ORDER — ESTRADIOL 0.1 MG/G
CREAM VAGINAL
Qty: 42.5 G | Refills: 3 | Status: SHIPPED | OUTPATIENT
Start: 2025-07-08

## 2025-08-09 ENCOUNTER — HOSPITAL ENCOUNTER (EMERGENCY)
Facility: HOSPITAL | Age: 75
Discharge: HOME | End: 2025-08-09
Attending: STUDENT IN AN ORGANIZED HEALTH CARE EDUCATION/TRAINING PROGRAM
Payer: MEDICARE

## 2025-08-09 ENCOUNTER — APPOINTMENT (OUTPATIENT)
Dept: CT IMAGING | Facility: HOSPITAL | Age: 75
End: 2025-08-09
Payer: MEDICARE

## 2025-08-09 VITALS
OXYGEN SATURATION: 99 % | RESPIRATION RATE: 16 BRPM | HEART RATE: 52 BPM | DIASTOLIC BLOOD PRESSURE: 64 MMHG | HEIGHT: 63 IN | SYSTOLIC BLOOD PRESSURE: 137 MMHG | TEMPERATURE: 97.5 F | BODY MASS INDEX: 26.58 KG/M2 | WEIGHT: 150 LBS

## 2025-08-09 DIAGNOSIS — K52.9 ENTEROCOLITIS: Primary | ICD-10-CM

## 2025-08-09 LAB
ALBUMIN SERPL-MCNC: 4.1 G/DL (ref 3.5–5.2)
ALBUMIN/GLOB SERPL: 1.4 G/DL
ALP SERPL-CCNC: 50 U/L (ref 39–117)
ALT SERPL W P-5'-P-CCNC: 9 U/L (ref 1–33)
ANION GAP SERPL CALCULATED.3IONS-SCNC: 17.6 MMOL/L (ref 5–15)
AST SERPL-CCNC: 21 U/L (ref 1–32)
BASOPHILS # BLD AUTO: 0.03 10*3/MM3 (ref 0–0.2)
BASOPHILS NFR BLD AUTO: 0.4 % (ref 0–1.5)
BILIRUB SERPL-MCNC: 0.2 MG/DL (ref 0–1.2)
BILIRUB UR QL STRIP: NEGATIVE
BUN SERPL-MCNC: 47 MG/DL (ref 8–23)
BUN/CREAT SERPL: 32.4 (ref 7–25)
CALCIUM SPEC-SCNC: 9 MG/DL (ref 8.6–10.5)
CHLORIDE SERPL-SCNC: 106 MMOL/L (ref 98–107)
CLARITY UR: CLEAR
CO2 SERPL-SCNC: 12.4 MMOL/L (ref 22–29)
COLOR UR: YELLOW
CREAT SERPL-MCNC: 1.45 MG/DL (ref 0.57–1)
DEPRECATED RDW RBC AUTO: 46 FL (ref 37–54)
EGFRCR SERPLBLD CKD-EPI 2021: 38.2 ML/MIN/1.73
EOSINOPHIL # BLD AUTO: 0.15 10*3/MM3 (ref 0–0.4)
EOSINOPHIL NFR BLD AUTO: 1.8 % (ref 0.3–6.2)
ERYTHROCYTE [DISTWIDTH] IN BLOOD BY AUTOMATED COUNT: 13.3 % (ref 12.3–15.4)
GLOBULIN UR ELPH-MCNC: 2.9 GM/DL
GLUCOSE SERPL-MCNC: 91 MG/DL (ref 65–99)
GLUCOSE UR STRIP-MCNC: NEGATIVE MG/DL
HCT VFR BLD AUTO: 43.1 % (ref 34–46.6)
HGB BLD-MCNC: 14 G/DL (ref 12–15.9)
HGB UR QL STRIP.AUTO: NEGATIVE
IMM GRANULOCYTES # BLD AUTO: 0.03 10*3/MM3 (ref 0–0.05)
IMM GRANULOCYTES NFR BLD AUTO: 0.4 % (ref 0–0.5)
KETONES UR QL STRIP: NEGATIVE
LEUKOCYTE ESTERASE UR QL STRIP.AUTO: NEGATIVE
LIPASE SERPL-CCNC: 66 U/L (ref 13–60)
LYMPHOCYTES # BLD AUTO: 2.06 10*3/MM3 (ref 0.7–3.1)
LYMPHOCYTES NFR BLD AUTO: 25 % (ref 19.6–45.3)
MCH RBC QN AUTO: 30.3 PG (ref 26.6–33)
MCHC RBC AUTO-ENTMCNC: 32.5 G/DL (ref 31.5–35.7)
MCV RBC AUTO: 93.3 FL (ref 79–97)
MONOCYTES # BLD AUTO: 0.6 10*3/MM3 (ref 0.1–0.9)
MONOCYTES NFR BLD AUTO: 7.3 % (ref 5–12)
NEUTROPHILS NFR BLD AUTO: 5.37 10*3/MM3 (ref 1.7–7)
NEUTROPHILS NFR BLD AUTO: 65.1 % (ref 42.7–76)
NITRITE UR QL STRIP: NEGATIVE
NRBC BLD AUTO-RTO: 0 /100 WBC (ref 0–0.2)
PH UR STRIP.AUTO: 5.5 [PH] (ref 5–8)
PLATELET # BLD AUTO: 198 10*3/MM3 (ref 140–450)
PMV BLD AUTO: 12 FL (ref 6–12)
POTASSIUM SERPL-SCNC: 4.1 MMOL/L (ref 3.5–5.2)
PROT SERPL-MCNC: 7 G/DL (ref 6–8.5)
PROT UR QL STRIP: NEGATIVE
RBC # BLD AUTO: 4.62 10*6/MM3 (ref 3.77–5.28)
SODIUM SERPL-SCNC: 136 MMOL/L (ref 136–145)
SP GR UR STRIP: 1.02 (ref 1–1.03)
UROBILINOGEN UR QL STRIP: NORMAL
WBC NRBC COR # BLD AUTO: 8.24 10*3/MM3 (ref 3.4–10.8)

## 2025-08-09 PROCEDURE — 80053 COMPREHEN METABOLIC PANEL: CPT | Performed by: NURSE PRACTITIONER

## 2025-08-09 PROCEDURE — 74177 CT ABD & PELVIS W/CONTRAST: CPT

## 2025-08-09 PROCEDURE — 25010000002 MORPHINE PER 10 MG: Performed by: STUDENT IN AN ORGANIZED HEALTH CARE EDUCATION/TRAINING PROGRAM

## 2025-08-09 PROCEDURE — 96374 THER/PROPH/DIAG INJ IV PUSH: CPT

## 2025-08-09 PROCEDURE — 25510000001 IOPAMIDOL 61 % SOLUTION: Performed by: STUDENT IN AN ORGANIZED HEALTH CARE EDUCATION/TRAINING PROGRAM

## 2025-08-09 PROCEDURE — 99285 EMERGENCY DEPT VISIT HI MDM: CPT | Performed by: STUDENT IN AN ORGANIZED HEALTH CARE EDUCATION/TRAINING PROGRAM

## 2025-08-09 PROCEDURE — 85025 COMPLETE CBC W/AUTO DIFF WBC: CPT | Performed by: NURSE PRACTITIONER

## 2025-08-09 PROCEDURE — 96361 HYDRATE IV INFUSION ADD-ON: CPT

## 2025-08-09 PROCEDURE — 25810000003 SODIUM CHLORIDE 0.9 % SOLUTION: Performed by: NURSE PRACTITIONER

## 2025-08-09 PROCEDURE — 96375 TX/PRO/DX INJ NEW DRUG ADDON: CPT

## 2025-08-09 PROCEDURE — 83690 ASSAY OF LIPASE: CPT | Performed by: NURSE PRACTITIONER

## 2025-08-09 PROCEDURE — 81003 URINALYSIS AUTO W/O SCOPE: CPT | Performed by: NURSE PRACTITIONER

## 2025-08-09 RX ORDER — PANTOPRAZOLE SODIUM 40 MG/1
40 TABLET, DELAYED RELEASE ORAL DAILY
Qty: 30 TABLET | Refills: 0 | Status: SHIPPED | OUTPATIENT
Start: 2025-08-09 | End: 2025-09-08

## 2025-08-09 RX ORDER — IOPAMIDOL 612 MG/ML
100 INJECTION, SOLUTION INTRAVASCULAR
Status: COMPLETED | OUTPATIENT
Start: 2025-08-09 | End: 2025-08-09

## 2025-08-09 RX ORDER — MORPHINE SULFATE 2 MG/ML
2 INJECTION, SOLUTION INTRAMUSCULAR; INTRAVENOUS ONCE
Status: COMPLETED | OUTPATIENT
Start: 2025-08-09 | End: 2025-08-09

## 2025-08-09 RX ORDER — PANTOPRAZOLE SODIUM 40 MG/10ML
40 INJECTION, POWDER, LYOPHILIZED, FOR SOLUTION INTRAVENOUS ONCE
Status: COMPLETED | OUTPATIENT
Start: 2025-08-09 | End: 2025-08-09

## 2025-08-09 RX ADMIN — SODIUM CHLORIDE 1000 ML: 9 INJECTION, SOLUTION INTRAVENOUS at 14:36

## 2025-08-09 RX ADMIN — PANTOPRAZOLE SODIUM 40 MG: 40 INJECTION, POWDER, LYOPHILIZED, FOR SOLUTION INTRAVENOUS at 17:10

## 2025-08-09 RX ADMIN — IOPAMIDOL 100 ML: 612 INJECTION, SOLUTION INTRAVENOUS at 14:57

## 2025-08-09 RX ADMIN — MORPHINE SULFATE 2 MG: 2 INJECTION, SOLUTION INTRAMUSCULAR; INTRAVENOUS at 17:10

## 2025-08-12 ENCOUNTER — TRANSCRIBE ORDERS (OUTPATIENT)
Dept: ADMINISTRATIVE | Facility: HOSPITAL | Age: 75
End: 2025-08-12
Payer: MEDICARE

## 2025-08-12 DIAGNOSIS — R10.11 ABDOMINAL PAIN, RIGHT UPPER QUADRANT: Primary | ICD-10-CM

## 2025-08-14 ENCOUNTER — HOSPITAL ENCOUNTER (OUTPATIENT)
Dept: MRI IMAGING | Facility: HOSPITAL | Age: 75
Discharge: HOME | End: 2025-08-14
Payer: MEDICARE

## 2025-08-14 DIAGNOSIS — R10.11 ABDOMINAL PAIN, RIGHT UPPER QUADRANT: ICD-10-CM

## 2025-08-19 ENCOUNTER — OFFICE VISIT (OUTPATIENT)
Dept: GASTROENTEROLOGY | Facility: CLINIC | Age: 75
End: 2025-08-19
Payer: MEDICARE

## 2025-08-19 VITALS
OXYGEN SATURATION: 98 % | BODY MASS INDEX: 27 KG/M2 | SYSTOLIC BLOOD PRESSURE: 166 MMHG | HEIGHT: 63 IN | WEIGHT: 152.4 LBS | DIASTOLIC BLOOD PRESSURE: 90 MMHG | HEART RATE: 58 BPM | RESPIRATION RATE: 16 BRPM

## 2025-08-19 DIAGNOSIS — K21.9 GASTROESOPHAGEAL REFLUX DISEASE WITHOUT ESOPHAGITIS: Chronic | ICD-10-CM

## 2025-08-19 DIAGNOSIS — Q45.3 PANCREATIC ABNORMALITY: Chronic | ICD-10-CM

## 2025-08-19 DIAGNOSIS — R10.30 LOWER ABDOMINAL PAIN: Chronic | ICD-10-CM

## 2025-08-19 DIAGNOSIS — R10.13 EPIGASTRIC PAIN: Chronic | ICD-10-CM

## 2025-08-19 DIAGNOSIS — Z86.0100 PERSONAL HISTORY OF COLON POLYPS, UNSPECIFIED: Chronic | ICD-10-CM

## 2025-08-19 DIAGNOSIS — R93.3 ABNORMAL CT SCAN, STOMACH: Chronic | ICD-10-CM

## 2025-08-19 DIAGNOSIS — K83.9 BILE DUCT ABNORMALITY: Chronic | ICD-10-CM

## 2025-08-19 DIAGNOSIS — R93.3 ABNORMAL CT SCAN, COLON: Chronic | ICD-10-CM

## 2025-08-19 DIAGNOSIS — R11.2 NAUSEA AND VOMITING, UNSPECIFIED VOMITING TYPE: Chronic | ICD-10-CM

## 2025-08-19 DIAGNOSIS — R19.7 DIARRHEA, UNSPECIFIED TYPE: Primary | Chronic | ICD-10-CM

## 2025-08-19 PROCEDURE — 3077F SYST BP >= 140 MM HG: CPT | Performed by: NURSE PRACTITIONER

## 2025-08-19 PROCEDURE — 99214 OFFICE O/P EST MOD 30 MIN: CPT | Performed by: NURSE PRACTITIONER

## 2025-08-19 PROCEDURE — 1160F RVW MEDS BY RX/DR IN RCRD: CPT | Performed by: NURSE PRACTITIONER

## 2025-08-19 PROCEDURE — 3080F DIAST BP >= 90 MM HG: CPT | Performed by: NURSE PRACTITIONER

## 2025-08-19 PROCEDURE — 1159F MED LIST DOCD IN RCRD: CPT | Performed by: NURSE PRACTITIONER

## 2025-08-19 RX ORDER — SODIUM, POTASSIUM,MAG SULFATES 17.5-3.13G
SOLUTION, RECONSTITUTED, ORAL ORAL
Qty: 177 ML | Refills: 0 | Status: SHIPPED | OUTPATIENT
Start: 2025-08-19 | End: 2025-08-20

## 2025-08-19 RX ORDER — SODIUM CHLORIDE 9 MG/ML
70 INJECTION, SOLUTION INTRAVENOUS CONTINUOUS PRN
OUTPATIENT
Start: 2025-08-19 | End: 2025-08-20

## 2025-08-19 RX ORDER — BISACODYL 5 MG/1
TABLET, DELAYED RELEASE ORAL
Qty: 4 TABLET | Refills: 0 | Status: SHIPPED | OUTPATIENT
Start: 2025-08-19 | End: 2025-08-20 | Stop reason: SDUPTHER

## 2025-08-20 ENCOUNTER — TELEPHONE (OUTPATIENT)
Dept: GASTROENTEROLOGY | Facility: CLINIC | Age: 75
End: 2025-08-20
Payer: MEDICARE

## 2025-08-20 DIAGNOSIS — Z86.0100 PERSONAL HISTORY OF COLON POLYPS, UNSPECIFIED: ICD-10-CM

## 2025-08-20 DIAGNOSIS — R93.3 ABNORMAL CT SCAN, COLON: Primary | ICD-10-CM

## 2025-08-20 RX ORDER — POLYETHYLENE GLYCOL 3350 17 G/17G
POWDER, FOR SOLUTION ORAL
Qty: 238 G | Refills: 0 | Status: SHIPPED | OUTPATIENT
Start: 2025-08-20

## 2025-08-20 RX ORDER — BISACODYL 5 MG/1
TABLET, DELAYED RELEASE ORAL
Qty: 4 TABLET | Refills: 0 | Status: SHIPPED | OUTPATIENT
Start: 2025-08-20

## 2025-08-21 DIAGNOSIS — K83.9 BILE DUCT ABNORMALITY: Primary | ICD-10-CM
